# Patient Record
Sex: FEMALE | Race: WHITE | Employment: OTHER | URBAN - METROPOLITAN AREA
[De-identification: names, ages, dates, MRNs, and addresses within clinical notes are randomized per-mention and may not be internally consistent; named-entity substitution may affect disease eponyms.]

---

## 2017-10-13 PROBLEM — H61.22 EXCESSIVE CERUMEN IN EAR CANAL, LEFT: Status: ACTIVE | Noted: 2017-10-11

## 2017-10-13 PROBLEM — N39.41 URGE INCONTINENCE: Status: ACTIVE | Noted: 2017-10-13

## 2017-10-13 PROBLEM — N39.3 STRESS INCONTINENCE: Status: ACTIVE | Noted: 2017-10-13

## 2017-10-13 PROBLEM — R26.81 UNSTEADY GAIT: Status: ACTIVE | Noted: 2017-02-08

## 2017-10-13 PROBLEM — Z86.010 HISTORY OF COLONIC POLYPS: Status: ACTIVE | Noted: 2017-01-03

## 2017-10-13 PROBLEM — R13.10 DYSPHAGIA: Status: ACTIVE | Noted: 2017-01-26

## 2019-11-04 ENCOUNTER — HOSPITAL ENCOUNTER (INPATIENT)
Age: 64
LOS: 4 days | Discharge: ANOTHER HOSPITAL | DRG: 281 | End: 2019-11-08
Attending: EMERGENCY MEDICINE | Admitting: INTERNAL MEDICINE
Payer: MEDICARE

## 2019-11-04 ENCOUNTER — APPOINTMENT (OUTPATIENT)
Dept: GENERAL RADIOLOGY | Age: 64
DRG: 281 | End: 2019-11-04
Attending: EMERGENCY MEDICINE
Payer: MEDICARE

## 2019-11-04 ENCOUNTER — HOSPITAL ENCOUNTER (EMERGENCY)
Dept: CT IMAGING | Age: 64
Discharge: HOME | DRG: 281 | End: 2019-11-04
Attending: EMERGENCY MEDICINE
Payer: MEDICARE

## 2019-11-04 DIAGNOSIS — I48.91 A-FIB (HCC): ICD-10-CM

## 2019-11-04 DIAGNOSIS — R06.09 DOE (DYSPNEA ON EXERTION): ICD-10-CM

## 2019-11-04 DIAGNOSIS — R94.39 ABNORMAL NUCLEAR STRESS TEST: ICD-10-CM

## 2019-11-04 DIAGNOSIS — R77.8 ELEVATED TROPONIN: ICD-10-CM

## 2019-11-04 DIAGNOSIS — I48.91 ATRIAL FIBRILLATION WITH RVR (HCC): Primary | ICD-10-CM

## 2019-11-04 LAB
ALBUMIN SERPL-MCNC: 3.8 G/DL (ref 3.4–5)
ALBUMIN/GLOB SERPL: 0.9 {RATIO} (ref 0.8–1.7)
ALP SERPL-CCNC: 110 U/L (ref 45–117)
ALT SERPL-CCNC: 26 U/L (ref 13–56)
ANION GAP SERPL CALC-SCNC: 8 MMOL/L (ref 3–18)
APTT PPP: 29.3 SEC (ref 23–36.4)
AST SERPL-CCNC: 22 U/L (ref 10–38)
ATRIAL RATE: 105 BPM
ATRIAL RATE: 326 BPM
BASOPHILS # BLD: 0 K/UL (ref 0–0.1)
BASOPHILS NFR BLD: 1 % (ref 0–2)
BILIRUB SERPL-MCNC: 0.3 MG/DL (ref 0.2–1)
BNP SERPL-MCNC: 403 PG/ML (ref 0–900)
BUN SERPL-MCNC: 15 MG/DL (ref 7–18)
BUN/CREAT SERPL: 14 (ref 12–20)
CALCIUM SERPL-MCNC: 9.8 MG/DL (ref 8.5–10.1)
CALCULATED R AXIS, ECG10: -88 DEGREES
CALCULATED R AXIS, ECG10: -90 DEGREES
CALCULATED T AXIS, ECG11: 41 DEGREES
CALCULATED T AXIS, ECG11: 51 DEGREES
CHLORIDE SERPL-SCNC: 109 MMOL/L (ref 100–111)
CK MB CFR SERPL CALC: 3.3 % (ref 0–4)
CK MB CFR SERPL CALC: 3.7 % (ref 0–4)
CK MB SERPL-MCNC: 6.7 NG/ML (ref 5–25)
CK MB SERPL-MCNC: 7.2 NG/ML (ref 5–25)
CK SERPL-CCNC: 182 U/L (ref 26–192)
CK SERPL-CCNC: 215 U/L (ref 26–192)
CO2 SERPL-SCNC: 26 MMOL/L (ref 21–32)
CREAT SERPL-MCNC: 1.09 MG/DL (ref 0.6–1.3)
D DIMER PPP FEU-MCNC: 1.13 UG/ML(FEU)
DIAGNOSIS, 93000: NORMAL
DIAGNOSIS, 93000: NORMAL
DIFFERENTIAL METHOD BLD: NORMAL
EOSINOPHIL # BLD: 0.2 K/UL (ref 0–0.4)
EOSINOPHIL NFR BLD: 3 % (ref 0–5)
ERYTHROCYTE [DISTWIDTH] IN BLOOD BY AUTOMATED COUNT: 14.3 % (ref 11.6–14.5)
GLOBULIN SER CALC-MCNC: 4.4 G/DL (ref 2–4)
GLUCOSE SERPL-MCNC: 122 MG/DL (ref 74–99)
HCT VFR BLD AUTO: 44.7 % (ref 35–45)
HGB BLD-MCNC: 14.3 G/DL (ref 12–16)
LYMPHOCYTES # BLD: 2.7 K/UL (ref 0.9–3.6)
LYMPHOCYTES NFR BLD: 33 % (ref 21–52)
MCH RBC QN AUTO: 28.5 PG (ref 24–34)
MCHC RBC AUTO-ENTMCNC: 32 G/DL (ref 31–37)
MCV RBC AUTO: 89.2 FL (ref 74–97)
MONOCYTES # BLD: 0.8 K/UL (ref 0.05–1.2)
MONOCYTES NFR BLD: 10 % (ref 3–10)
NEUTS SEG # BLD: 4.3 K/UL (ref 1.8–8)
NEUTS SEG NFR BLD: 53 % (ref 40–73)
PLATELET # BLD AUTO: 294 K/UL (ref 135–420)
PMV BLD AUTO: 9.9 FL (ref 9.2–11.8)
POTASSIUM SERPL-SCNC: 4.1 MMOL/L (ref 3.5–5.5)
PROT SERPL-MCNC: 8.2 G/DL (ref 6.4–8.2)
Q-T INTERVAL, ECG07: 288 MS
Q-T INTERVAL, ECG07: 310 MS
QRS DURATION, ECG06: 112 MS
QRS DURATION, ECG06: 118 MS
QTC CALCULATION (BEZET), ECG08: 397 MS
QTC CALCULATION (BEZET), ECG08: 405 MS
RBC # BLD AUTO: 5.01 M/UL (ref 4.2–5.3)
SODIUM SERPL-SCNC: 143 MMOL/L (ref 136–145)
TROPONIN I BLD-MCNC: 0.11 NG/ML (ref 0–0.08)
TROPONIN I SERPL-MCNC: 0.05 NG/ML (ref 0–0.04)
TROPONIN I SERPL-MCNC: 0.24 NG/ML (ref 0–0.04)
VENTRICULAR RATE, ECG03: 119 BPM
VENTRICULAR RATE, ECG03: 99 BPM
WBC # BLD AUTO: 8.1 K/UL (ref 4.6–13.2)

## 2019-11-04 PROCEDURE — 85379 FIBRIN DEGRADATION QUANT: CPT

## 2019-11-04 PROCEDURE — 74011636320 HC RX REV CODE- 636/320: Performed by: EMERGENCY MEDICINE

## 2019-11-04 PROCEDURE — 74011250636 HC RX REV CODE- 250/636: Performed by: INTERNAL MEDICINE

## 2019-11-04 PROCEDURE — 65270000029 HC RM PRIVATE

## 2019-11-04 PROCEDURE — 93005 ELECTROCARDIOGRAM TRACING: CPT

## 2019-11-04 PROCEDURE — 82550 ASSAY OF CK (CPK): CPT

## 2019-11-04 PROCEDURE — 74011250637 HC RX REV CODE- 250/637: Performed by: EMERGENCY MEDICINE

## 2019-11-04 PROCEDURE — 71275 CT ANGIOGRAPHY CHEST: CPT

## 2019-11-04 PROCEDURE — 84484 ASSAY OF TROPONIN QUANT: CPT

## 2019-11-04 PROCEDURE — 99285 EMERGENCY DEPT VISIT HI MDM: CPT

## 2019-11-04 PROCEDURE — 85730 THROMBOPLASTIN TIME PARTIAL: CPT

## 2019-11-04 PROCEDURE — 83880 ASSAY OF NATRIURETIC PEPTIDE: CPT

## 2019-11-04 PROCEDURE — 96374 THER/PROPH/DIAG INJ IV PUSH: CPT

## 2019-11-04 PROCEDURE — 74011000250 HC RX REV CODE- 250: Performed by: EMERGENCY MEDICINE

## 2019-11-04 PROCEDURE — 74011250636 HC RX REV CODE- 250/636: Performed by: EMERGENCY MEDICINE

## 2019-11-04 PROCEDURE — 85025 COMPLETE CBC W/AUTO DIFF WBC: CPT

## 2019-11-04 PROCEDURE — 96375 TX/PRO/DX INJ NEW DRUG ADDON: CPT

## 2019-11-04 PROCEDURE — 71045 X-RAY EXAM CHEST 1 VIEW: CPT

## 2019-11-04 PROCEDURE — 74011000250 HC RX REV CODE- 250: Performed by: INTERNAL MEDICINE

## 2019-11-04 PROCEDURE — 80053 COMPREHEN METABOLIC PANEL: CPT

## 2019-11-04 RX ORDER — METOPROLOL SUCCINATE 25 MG/1
25 TABLET, EXTENDED RELEASE ORAL ONCE
Status: COMPLETED | OUTPATIENT
Start: 2019-11-04 | End: 2019-11-04

## 2019-11-04 RX ORDER — DIGOXIN 0.25 MG/ML
250 INJECTION INTRAMUSCULAR; INTRAVENOUS
Status: COMPLETED | OUTPATIENT
Start: 2019-11-04 | End: 2019-11-04

## 2019-11-04 RX ORDER — ONDANSETRON 2 MG/ML
4 INJECTION INTRAMUSCULAR; INTRAVENOUS
Status: COMPLETED | OUTPATIENT
Start: 2019-11-04 | End: 2019-11-04

## 2019-11-04 RX ORDER — DILTIAZEM HYDROCHLORIDE 120 MG/1
240 CAPSULE, COATED, EXTENDED RELEASE ORAL
Status: COMPLETED | OUTPATIENT
Start: 2019-11-04 | End: 2019-11-04

## 2019-11-04 RX ORDER — DILTIAZEM HYDROCHLORIDE 5 MG/ML
10 INJECTION INTRAVENOUS
Status: COMPLETED | OUTPATIENT
Start: 2019-11-04 | End: 2019-11-04

## 2019-11-04 RX ORDER — CARVEDILOL 12.5 MG/1
6.25 TABLET ORAL
Status: COMPLETED | OUTPATIENT
Start: 2019-11-04 | End: 2019-11-04

## 2019-11-04 RX ORDER — FENTANYL CITRATE 50 UG/ML
50 INJECTION, SOLUTION INTRAMUSCULAR; INTRAVENOUS
Status: COMPLETED | OUTPATIENT
Start: 2019-11-04 | End: 2019-11-04

## 2019-11-04 RX ORDER — HEPARIN SODIUM 10000 [USP'U]/100ML
12-25 INJECTION, SOLUTION INTRAVENOUS
Status: DISCONTINUED | OUTPATIENT
Start: 2019-11-04 | End: 2019-11-04

## 2019-11-04 RX ORDER — HEPARIN SODIUM 1000 [USP'U]/ML
4000 INJECTION, SOLUTION INTRAVENOUS; SUBCUTANEOUS ONCE
Status: COMPLETED | OUTPATIENT
Start: 2019-11-04 | End: 2019-11-04

## 2019-11-04 RX ORDER — HEPARIN SODIUM 10000 [USP'U]/100ML
8.9-25 INJECTION, SOLUTION INTRAVENOUS
Status: DISCONTINUED | OUTPATIENT
Start: 2019-11-04 | End: 2019-11-05

## 2019-11-04 RX ORDER — DILTIAZEM HYDROCHLORIDE 120 MG/1
120 CAPSULE, COATED, EXTENDED RELEASE ORAL DAILY
Status: DISCONTINUED | OUTPATIENT
Start: 2019-11-05 | End: 2019-11-04

## 2019-11-04 RX ORDER — DILTIAZEM HYDROCHLORIDE 120 MG/1
240 CAPSULE, COATED, EXTENDED RELEASE ORAL DAILY
Status: DISCONTINUED | OUTPATIENT
Start: 2019-11-05 | End: 2019-11-04

## 2019-11-04 RX ADMIN — DIGOXIN 250 MCG: 0.25 INJECTION INTRAMUSCULAR; INTRAVENOUS at 19:57

## 2019-11-04 RX ADMIN — HEPARIN SODIUM 8.9 UNITS/KG/HR: 10000 INJECTION, SOLUTION INTRAVENOUS at 22:36

## 2019-11-04 RX ADMIN — IOPAMIDOL 100 ML: 755 INJECTION, SOLUTION INTRAVENOUS at 21:14

## 2019-11-04 RX ADMIN — DILTIAZEM HYDROCHLORIDE 10 MG: 5 INJECTION INTRAVENOUS at 17:04

## 2019-11-04 RX ADMIN — ONDANSETRON 4 MG: 2 INJECTION INTRAMUSCULAR; INTRAVENOUS at 21:06

## 2019-11-04 RX ADMIN — SODIUM CHLORIDE 1000 ML: 900 INJECTION, SOLUTION INTRAVENOUS at 17:02

## 2019-11-04 RX ADMIN — FENTANYL CITRATE 50 MCG: 50 INJECTION INTRAMUSCULAR; INTRAVENOUS at 20:35

## 2019-11-04 RX ADMIN — CARVEDILOL 6.25 MG: 12.5 TABLET, FILM COATED ORAL at 17:51

## 2019-11-04 RX ADMIN — NEOMYCIN AND POLYMYXIN B SULFATES AND BACITRACIN ZINC 1 PACKET: 400; 3.5; 5 OINTMENT TOPICAL at 23:49

## 2019-11-04 RX ADMIN — HEPARIN SODIUM 4000 UNITS: 1000 INJECTION INTRAVENOUS; SUBCUTANEOUS at 22:35

## 2019-11-04 RX ADMIN — DILTIAZEM HYDROCHLORIDE 240 MG: 120 CAPSULE, COATED, EXTENDED RELEASE ORAL at 18:37

## 2019-11-04 RX ADMIN — METOPROLOL SUCCINATE 25 MG: 25 TABLET, EXTENDED RELEASE ORAL at 22:21

## 2019-11-04 NOTE — ED PROVIDER NOTES
EMERGENCY DEPARTMENT HISTORY AND PHYSICAL EXAM    Date: 11/4/2019  Patient Name: Joshua Simeon    History of Presenting Illness     Chief Complaint   Patient presents with    Shortness of Breath    Chest Pain         History Provided By: Patient      Joshua Simeon is a 71 y.o. female with PMHX of atrial fib, bladder issues who presents to the emergency department C/O chest discomfort and shortness of breath. Patient states his symptoms started earlier today while she was at her urologist office. She states the symptoms started to become worse later in the afternoon. She localizes the pain to the center of her chest and radiates down both of her arms. She states she feels like she is having a hard time catching her breath. She states she was told by her urologist to hold all of her medications in the morning for the bladder procedure she had done today in their office. She states she does take diltiazem 240 mg and carvedilol 6.25 mg twice a day. She states she is not on blood thinners but does take a daily aspirin. PCP: Jerel Pastrana MD    Current Facility-Administered Medications   Medication Dose Route Frequency Provider Last Rate Last Dose    heparin (porcine) 1,000 unit/mL injection 4,000 Units  4,000 Units IntraVENous ONCE Vanesa Velazquez, DO        heparin 25,000 units in D5W 250 ml infusion  12-25 Units/kg/hr IntraVENous TITRATE Vanesa Velazquez DO        metoprolol succinate (TOPROL-XL) XL tablet 25 mg  25 mg Oral ONCE Vanesa Velazquez, DO         Current Outpatient Medications   Medication Sig Dispense Refill    nitroglycerin (NITROSTAT) 0.4 mg SL tablet 0.4 mg.      docusate sodium (COLACE) 100 mg capsule EQ STOOL SOFTENER CAPS      dilTIAZem CD (CARTIA XT) 240 mg ER capsule Take 1 Cap by mouth.       clotrimazole-betamethasone (LOTRISONE) topical cream CLOTRIMAZOLE-BETAMETHASONE 1-0.05 % CREA      cholecalciferol, vitamin D3, 2,000 unit tab Take 1 Tab by mouth.  carvedilol (COREG) 6.25 mg tablet 6.25 mg.      busPIRone (BUSPAR) 5 mg tablet Take 5 mg by mouth.  aspirin delayed-release 81 mg tablet Take 1 Tab by mouth.  albuterol (PROAIR HFA) 90 mcg/actuation inhaler PROAIR  (90 Base) MCG/ACT AERS      nystatin (MYCOSTATIN) powder NYSTATIN 126110 UNIT/GM POWD      omega-3 acid ethyl esters (LOVAZA) 1 gram capsule FISH OIL 1000 MG CAPS      omeprazole (PRILOSEC) 20 mg capsule Take 20 mg by mouth.  oxyCODONE-acetaminophen (PERCOCET) 7.5-325 mg per tablet Take 1 Tab by mouth.  potassium chloride SR (KLOR-CON 10) 10 mEq tablet Take 1 Tab by mouth.  pravastatin (PRAVACHOL) 40 mg tablet Take 40 mg by mouth.  promethazine (PHENERGAN) 25 mg tablet Take 25 mg by mouth.  spironolactone (ALDACTONE) 25 mg tablet Take 1 Tab by mouth.  venlafaxine-SR (EFFEXOR XR) 75 mg capsule Take 225 mg by mouth. Past History     Past Medical History:  Past Medical History:   Diagnosis Date    Adenocarcinoma of lung ([address] 4/14/2008    Arthritis     Arthropathy     Bladder pain     Cardiac arrhythmia     Chronic right-sided low back pain without sciatica 4/23/2014    Degeneration of intervertebral disc of lumbosacral region 8/24/2009    Last Assessment & Plan:    Patient will be continued on oxycodone acetaminophen  7.5-325 mg tablets as directed. Total of 120 dispensed month. Patient is on a quarterly visiting schedule for her appointments  As per contract.     Difficulty urinating     Dysphagia 1/26/2017    Dysuria 2/11/2016    Enthesopathy of hip region 1/8/2009    Esophageal reflux     Excessive cerumen in ear canal, left 10/11/2017    Exposure of implanted vaginal mesh and other prosthetic materials into vagina 5/15/2012    Frequency of micturition 4/17/2012    Gastroesophageal reflux disease 9/30/2004    History of blood transfusion     HTN (hypertension)     Hypertensive heart disease 1/21/2015  [facility]     Malignant neoplasm without specification of site ([facility] [facility]     Nocturia     Osteopenia 2012    SOB (shortness of breath)     Stress incontinence 10/13/2017    Urge incontinence     Urinary frequency        Past Surgical History:  Past Surgical History:   Procedure Laterality Date    HX  SECTION      HX UROLOGICAL  2017    Cysto, injection for chemodenervation of bladder, endoscopic injection of bulking material for urinary incontinence. Dr. Zita Lundy, [facility]. Family History:  History reviewed. No pertinent family history. Social History:  Social History     Tobacco Use    Smoking status: Former Smoker     Years: 15.00     Types: Cigarettes    Smokeless tobacco: Never Used   Substance Use Topics    Alcohol use: No    Drug use: No       Allergies: Allergies   Allergen Reactions    Latex Hives    Codeine Unknown (comments) and Anaphylaxis    Sulfa (Sulfonamide Antibiotics) Anaphylaxis    Ibuprofen Unknown (comments)    Nitrofurantoin Other (comments)    Penicillin G Unknown (comments)    Penicillins Hives    Sulfasalazine Unknown (comments)    Tramadol Other (comments) and Nausea and Vomiting         Review of Systems   Review of Systems   Constitutional: Negative for fever. Respiratory: Positive for chest tightness and shortness of breath. Cardiovascular: Positive for chest pain. Gastrointestinal: Negative for abdominal pain, nausea and vomiting.          Physical Exam     Vitals:    19 2000 19 2030 19 2045   BP: 117/69  137/78 141/77   Pulse: 97 (!) 105 (!) 111 (!) 108   Resp: 21 18 22 11   Temp:       SpO2: 98% 98% 98% 98%   Weight:       Height:         Physical Exam    Nursing notes and vital signs reviewed    Constitutional: Non toxic appearing, appears uncomfortable in mild distress  Head: Normocephalic, Atraumatic  Eyes: Pupils are equal, round, and reactive to light, EOMI  Neck: Supple  Cardiovascular: Tachycardic, irregular rhythm, no murmurs, rubs, or gallops  Chest: Normal work of breathing and chest excursion bilaterally  Lungs: Clear to ausculation bilaterally  Abdomen: Soft, non tender, non distended, normoactive bowel sounds  Back: No evidence of trauma or deformity  Extremities: No evidence of trauma or deformity, no LE edema  Skin: Warm and dry, normal cap refill  Neuro: Alert and appropriate, CN intact, normal speech, strength and sensation full and symmetric bilaterally, normal gait, normal coordination  Psychiatric: Normal mood and affect      Diagnostic Study Results     Labs -     Recent Results (from the past 48 hour(s))   EKG, 12 LEAD, INITIAL    Collection Time: 11/04/19  4:36 PM   Result Value Ref Range    Ventricular Rate 119 BPM    Atrial Rate 326 BPM    QRS Duration 112 ms    Q-T Interval 288 ms    QTC Calculation (Bezet) 405 ms    Calculated R Axis -90 degrees    Calculated T Axis 51 degrees    Diagnosis       Atrial fibrillation with rapid ventricular response  Right bundle branch block  Inferior infarct , age undetermined  Anterolateral infarct , age undetermined  Abnormal ECG  No previous ECGs available     CBC WITH AUTOMATED DIFF    Collection Time: 11/04/19  4:41 PM   Result Value Ref Range    WBC 8.1 4.6 - 13.2 K/uL    RBC 5.01 4.20 - 5.30 M/uL    HGB 14.3 12.0 - 16.0 g/dL    HCT 44.7 35.0 - 45.0 %    MCV 89.2 74.0 - 97.0 FL    MCH 28.5 24.0 - 34.0 PG    MCHC 32.0 31.0 - 37.0 g/dL    RDW 14.3 11.6 - 14.5 %    PLATELET 782 933 - 092 K/uL    MPV 9.9 9.2 - 11.8 FL    NEUTROPHILS 53 40 - 73 %    LYMPHOCYTES 33 21 - 52 %    MONOCYTES 10 3 - 10 %    EOSINOPHILS 3 0 - 5 %    BASOPHILS 1 0 - 2 %    ABS. NEUTROPHILS 4.3 1.8 - 8.0 K/UL    ABS. LYMPHOCYTES 2.7 0.9 - 3.6 K/UL    ABS. MONOCYTES 0.8 0.05 - 1.2 K/UL    ABS. EOSINOPHILS 0.2 0.0 - 0.4 K/UL    ABS.  BASOPHILS 0.0 0.0 - 0.1 K/UL    DF AUTOMATED     METABOLIC PANEL, COMPREHENSIVE    Collection Time: 11/04/19  4:41 PM Result Value Ref Range    Sodium 143 136 - 145 mmol/L    Potassium 4.1 3.5 - 5.5 mmol/L    Chloride 109 100 - 111 mmol/L    CO2 26 21 - 32 mmol/L    Anion gap 8 3.0 - 18 mmol/L    Glucose 122 (H) 74 - 99 mg/dL    BUN 15 7.0 - 18 MG/DL    Creatinine 1.09 0.6 - 1.3 MG/DL    BUN/Creatinine ratio 14 12 - 20      GFR est AA >60 >60 ml/min/1.73m2    GFR est non-AA 50 (L) >60 ml/min/1.73m2    Calcium 9.8 8.5 - 10.1 MG/DL    Bilirubin, total 0.3 0.2 - 1.0 MG/DL    ALT (SGPT) 26 13 - 56 U/L    AST (SGOT) 22 10 - 38 U/L    Alk.  phosphatase 110 45 - 117 U/L    Protein, total 8.2 6.4 - 8.2 g/dL    Albumin 3.8 3.4 - 5.0 g/dL    Globulin 4.4 (H) 2.0 - 4.0 g/dL    A-G Ratio 0.9 0.8 - 1.7     NT-PRO BNP    Collection Time: 11/04/19  4:41 PM   Result Value Ref Range    NT pro- 0 - 900 PG/ML   CARDIAC PANEL,(CK, CKMB & TROPONIN)    Collection Time: 11/04/19  4:41 PM   Result Value Ref Range     (H) 26 - 192 U/L    CK - MB 7.2 (H) <3.6 ng/ml    CK-MB Index 3.3 0.0 - 4.0 %    Troponin-I, QT 0.05 (H) 0.0 - 0.045 NG/ML   CARDIAC PANEL,(CK, CKMB & TROPONIN)    Collection Time: 11/04/19  7:58 PM   Result Value Ref Range     26 - 192 U/L    CK - MB 6.7 (H) <3.6 ng/ml    CK-MB Index 3.7 0.0 - 4.0 %    Troponin-I, QT 0.24 (H) 0.0 - 0.045 NG/ML   D DIMER    Collection Time: 11/04/19  7:58 PM   Result Value Ref Range    D DIMER 1.13 (H) <0.46 ug/ml(FEU)   EKG, 12 LEAD, SUBSEQUENT    Collection Time: 11/04/19  8:26 PM   Result Value Ref Range    Ventricular Rate 99 BPM    Atrial Rate 105 BPM    QRS Duration 118 ms    Q-T Interval 310 ms    QTC Calculation (Bezet) 397 ms    Calculated R Axis -88 degrees    Calculated T Axis 41 degrees    Diagnosis       Atrial fibrillation  Left axis deviation  Low voltage QRS  Right bundle branch block  Inferior infarct (cited on or before 04-NOV-2019)  Possible Anterolateral infarct (cited on or before 04-NOV-2019)  Abnormal ECG  When compared with ECG of 04-NOV-2019 16:36,  Questionable change in initial forces of Inferior leads         Radiologic Studies -   CTA CHEST W OR W WO CONT   Final Result   IMPRESSION:      1. No evidence of pulmonary embolism. 2.  No consolidation. 3. Indeterminate 3 mm right lower lobe nodule. Please see below Fleischner   Thor[employer] Society 2017 recommendations regarding follow-up of pulmonary nodules   of this size:   Single Nodule   A. Low risk:      < 6 mm: No routine follow up unless suspicious nodule morphology, upper lobe   location or both   B. High smoking or other exposure risk:      < 6 mm: No routine follow up. If suspicious nodule morphology, upper lobe   location or both, then F/U CT at 12 mo.      4. Indeterminate left adrenal lesion, statistically likely adenoma unless there   is known primary carcinoma. 5. Strictly indeterminate right renal hypodensity, perhaps hemorrhagic cyst.   Potentially routine outpatient follow-up renal ultrasound may better   characterize. XR CHEST PORT   Final Result   IMPRESSION:      Moderately underexpanded lungs without superimposed acute radiographic   abnormality. CT Results  (Last 48 hours)               11/04/19 2123  CTA CHEST W OR W WO CONT Final result    Impression:  IMPRESSION:       1. No evidence of pulmonary embolism. 2.  No consolidation. 3. Indeterminate 3 mm right lower lobe nodule. Please see below Fleischner   Thoracic Society 2017 recommendations regarding follow-up of pulmonary nodules   of this size:   Single Nodule   A. Low risk:      < 6 mm: No routine follow up unless suspicious nodule morphology, upper lobe   location or both   B. High smoking or other exposure risk:      < 6 mm: No routine follow up. If suspicious nodule morphology, upper lobe   location or both, then F/U CT at 12 mo.       4. Indeterminate left adrenal lesion, statistically likely adenoma unless there   is known primary carcinoma.        5. Strictly indeterminate right renal hypodensity, perhaps hemorrhagic cyst.   Potentially routine outpatient follow-up renal ultrasound may better   characterize. Narrative:  EXAM: CTA CHEST       INDICATION: Midsternal chest pain, shortness of breath. Possible PE.       COMPARISON: No prior study. TECHNIQUE: Axial CT imaging from the thoracic inlet through the diaphragm with   intravenous contrast utilizing CTA study for pulmonary artery evaluation. Coronal and sagittal MIP reformations were generated at a separate workstation. One or more dose reduction techniques were used on this CT: automated exposure   control, adjustment of the mAs and/or kVp according to patient's size, and   iterative reconstruction techniques. The specific techniques utilized on this CT   exam have been documented in the patient's electronic medical record. Digital   Imaging and Communications in Medicine (DICOM) format image data are available   to nonaffiliated external healthcare facilities or entities on a secure, media   free, reciprocally searchable basis with patient authorization for at least a   12-month period after this study. _______________       FINDINGS:       EXAM QUALITY: Overall exam quality is adequate. Pulmonary arterial enhancement   is adequate. The breath hold is satisfactory. PULMONARY ARTERIES: No convincing evidence of pulmonary embolism. MEDIASTINUM: Minimal atherosclerotic calcifications are present thoracic aorta. Minimal pericardial effusion is present. PLEURA: Unremarkable. LYMPH NODES: No enlarged lymph nodes by size criteria. LUNGS/AIRWAY: 3 mm nodule is present superior segment right lower lobe axial   image 46. Calcified granuloma is present anteriorly in the right middle lobe axial image   67. 3 mm lateral subpleural right middle lobe nodular density is present on   axial image 74, nonspecific but likely intrapulmonary lymph node. No consolidation is present.        UPPER ABDOMEN: 18 mm hypodensity is present associated with the lateral limb   left adrenal gland, indeterminate on this study with Hounsfield units in the   20s. Additional 13 mm right renal upper pole hypodensity, exophytic, is present   with Hounsfield units also indeterminate. OSSEOUS STRUCTURES: Degenerative changes are seen in the spine. OTHER: None   _______________               CXR Results  (Last 48 hours)               11/04/19 1650  XR CHEST PORT Final result    Impression:  IMPRESSION:       Moderately underexpanded lungs without superimposed acute radiographic   abnormality. Narrative:  EXAM: XR CHEST PORT       CLINICAL INDICATION/HISTORY: Shortness of breath with chest pain   -Additional: None       COMPARISON: None       TECHNIQUE: Frontal view of the chest       _______________       FINDINGS:       HEART AND MEDIASTINUM: Normal cardiac size and mediastinal contours. LUNGS AND PLEURAL SPACES: Lungs are moderately underexpanded. Left hemidiaphragm   is mildly elevated. No focal pneumonic opacity. No evidence of pneumothorax or   pleural effusion.        BONY THORAX AND SOFT TISSUES: No acute osseous abnormality       _______________                 Medications given in the ED-  Medications   heparin (porcine) 1,000 unit/mL injection 4,000 Units (has no administration in time range)   heparin 25,000 units in D5W 250 ml infusion (has no administration in time range)   metoprolol succinate (TOPROL-XL) XL tablet 25 mg (has no administration in time range)   dilTIAZem (CARDIZEM) injection 10 mg (10 mg IntraVENous Given 11/4/19 1704)   sodium chloride 0.9 % bolus infusion 1,000 mL (0 mL IntraVENous IV Completed 11/4/19 1732)   carvedilol (COREG) tablet 6.25 mg (6.25 mg Oral Given 11/4/19 1751)   dilTIAZem CD (CARDIZEM CD) capsule 240 mg (240 mg Oral Given 11/4/19 1837)   digoxin (LANOXIN) injection 250 mcg (250 mcg IntraVENous Given 11/4/19 1957)   fentaNYL citrate (PF) injection 50 mcg (50 mcg IntraVENous Given 11/4/19 2035)   ondansetron [facility]) injection 4 mg (4 mg IntraVENous Given 11/4/19 2106)         Medical Decision Making   I am the first provider for this patient. I reviewed the vital signs, available nursing notes, past medical history, past surgical history, family history and social history. Vital Signs-Reviewed the patient's vital signs. Pulse Oximetry Analysis - 100% on room air     Cardiac Monitor:  Rate: 115 bpm  Rhythm: sinus    EKG interpretation: (Preliminary)  EKG read by Dr. Nieves Balderas 119 atrial fib with rapid ventricular response, right bundle branch block, no ST changes, right axis    EKG interpretation: (Preliminary)  8:32 PM   A. fib 99 bpm. No acute JAMAR. Records Reviewed: Nursing Notes    Procedures:  Procedures    ED Course:   Patient's RVR is likely due to the fact that she was unable to take her medications this morning for the bladder procedure. We will give 10 mg of Cardizem IV and continue to monitor    Patient was given her oral home dose of carvedilol 6.25 mg as well as her home dose of extended release Cardizem 240 mg. She continued to have mild rapid ventricular rate between 100-115 the heart rate. SIGN OUT:  8:31 PM  Patient's presentation, labs/imaging and plan of care was reviewed with Dr. Jeff Soriano as part of sign out. They will follow up with Afib RVR as part of the plan discussed with the patient. Bella Teague DO      7:42 PM Discussed patient's history, exam, and available diagnostics results with Jeff Montoya MD, cardiology, who recommends digoxin, 2nd set of cardiac enzymes and D dimer     9:49 PM patient's troponin is not elevated to 0.24 from 0.05.  D-dimer was elevated however CTA was negative for PE.   Discussed patient's history, exam, and available diagnostics results with Jeff Montoya MD, cardiology, who agree with admission, starting a heparin drip, a dose of 25 mg of metoprolol XL now and then daily, echo in the morning        Diagnosis and Disposition     9:49 PM  I have spent 40 minutes of critical care time involved in lab review, consultations with specialist, family decision-making, and documentation. During this entire length of time I was immediately available to the patient. Critical Care: The reason for providing this level of medical care for this critically ill patient was due a critical illness that impaired one or more vital organ systems such that there was a high probability of imminent or life threatening deterioration in the patients condition. This care involved high complexity decision making to assess, manipulate, and support vital system functions, to treat this degreee vital organ system failure and to prevent further life threatening deterioration of the patients condition. Core Measures:  For Hospitalized Patients: Telemetry    1. Hospitalization Decision Time:  The decision to hospitalize the patient was made by 9:50 PM at 9:50 PM on 11/4/2019    2. Aspirin: Aspirin was not given because the patient did not present with a stroke at the time of their Emergency Department evaluation    9:14 PM  Patient is being admitted to the hospital by Altagracia Saenz MD. The results of their tests and reasons for their admission have been discussed with them and/or available family. They convey agreement and understanding for the need to be admitted and for their admission diagnosis. CONDITIONS ON ADMISSION:  Sepsis is not present at the time of admission. Pneumonia is not present at the time of admission. MRSA is not present at the time of admission. Wound infection is not present at the time of admission. Pressure Ulcer is not present at the time of admission. CLINICAL IMPRESSION:    1. Atrial fibrillation with RVR (HCC)    2. Elevated troponin      _______________________________      Please note that this dictation was completed with [employer], the Dragon Inside voice recognition software. Quite often unanticipated grammatical, syntax, homophones, and other interpretive errors are inadvertently transcribed by the computer software. Please disregard these errors. Please excuse any errors that have escaped final proofreading.

## 2019-11-04 NOTE — Clinical Note
TRANSFER - IN REPORT:  
 
Verbal report received from: Christiano. Report consisted of patient's Situation, Background, Assessment and  
Recommendations(SBAR). Opportunity for questions and clarification was provided. Assessment completed upon patient's arrival to unit and care assumed. Patient transported with a Cardiac Cath Tech / Patient Care Tech.

## 2019-11-04 NOTE — Clinical Note
Contrast Dose Calculator:  
Patient's age: 71.  
Patient's sex: Female. Patient weight (kg) = 114.4. Creatinine level (mg/dL) = 0.94. Creatinine clearance (mL/min): 107.76. Max Contrast dose per Creatinine Cl calculator = 242.45 mL.

## 2019-11-04 NOTE — Clinical Note
TRANSFER - OUT REPORT:  
 
Verbal report given to: [place]. Report consisted of patient's Situation, Background, Assessment and  
Recommendations(SBAR). Opportunity for questions and clarification was provided. Patient transported with a Cardiac Cath Tech / Patient Care Tech. Patient transported to: Care.

## 2019-11-05 ENCOUNTER — APPOINTMENT (OUTPATIENT)
Dept: NON INVASIVE DIAGNOSTICS | Age: 64
DRG: 281 | End: 2019-11-05
Attending: INTERNAL MEDICINE
Payer: MEDICARE

## 2019-11-05 PROBLEM — R26.81 UNSTEADY GAIT: Status: RESOLVED | Noted: 2017-02-08 | Resolved: 2019-11-05

## 2019-11-05 PROBLEM — R13.10 DYSPHAGIA: Status: RESOLVED | Noted: 2017-01-26 | Resolved: 2019-11-05

## 2019-11-05 PROBLEM — Z86.010 HISTORY OF COLONIC POLYPS: Status: RESOLVED | Noted: 2017-01-03 | Resolved: 2019-11-05

## 2019-11-05 LAB
ALBUMIN SERPL-MCNC: 3 G/DL (ref 3.4–5)
ALBUMIN/GLOB SERPL: 0.8 {RATIO} (ref 0.8–1.7)
ALP SERPL-CCNC: 88 U/L (ref 45–117)
ALT SERPL-CCNC: 19 U/L (ref 13–56)
ANION GAP SERPL CALC-SCNC: 6 MMOL/L (ref 3–18)
APTT PPP: 62.1 SEC (ref 23–36.4)
APTT PPP: 64.5 SEC (ref 23–36.4)
AST SERPL-CCNC: 17 U/L (ref 10–38)
AV PEAK GRADIENT: 36.37 MMHG
AV VELOCITY RATIO: 0.47
AV VTI RATIO: 0.7
BASOPHILS # BLD: 0 K/UL (ref 0–0.1)
BASOPHILS NFR BLD: 1 % (ref 0–2)
BILIRUB DIRECT SERPL-MCNC: 0.1 MG/DL (ref 0–0.2)
BILIRUB SERPL-MCNC: 0.3 MG/DL (ref 0.2–1)
BUN SERPL-MCNC: 12 MG/DL (ref 7–18)
BUN/CREAT SERPL: 11 (ref 12–20)
CALCIUM SERPL-MCNC: 9.2 MG/DL (ref 8.5–10.1)
CHLORIDE SERPL-SCNC: 111 MMOL/L (ref 100–111)
CK MB CFR SERPL CALC: 3.1 % (ref 0–4)
CK MB CFR SERPL CALC: 3.9 % (ref 0–4)
CK MB CFR SERPL CALC: 4 % (ref 0–4)
CK MB SERPL-MCNC: 4.6 NG/ML (ref 5–25)
CK MB SERPL-MCNC: 5.9 NG/ML (ref 5–25)
CK MB SERPL-MCNC: 7 NG/ML (ref 5–25)
CK SERPL-CCNC: 148 U/L (ref 26–192)
CK SERPL-CCNC: 151 U/L (ref 26–192)
CK SERPL-CCNC: 176 U/L (ref 26–192)
CO2 SERPL-SCNC: 26 MMOL/L (ref 21–32)
CREAT SERPL-MCNC: 1.05 MG/DL (ref 0.6–1.3)
DIFFERENTIAL METHOD BLD: ABNORMAL
ECHO AO ASC DIAM: 3.72 CM
ECHO AO ROOT DIAM: 3.22 CM
ECHO AV AREA PEAK VELOCITY: 1.4 CM2
ECHO AV AREA VTI: 2 CM2
ECHO AV AREA/BSA PEAK VELOCITY: 0.6 CM2/M2
ECHO AV AREA/BSA VTI: 0.9 CM2/M2
ECHO AV MEAN GRADIENT: 8.2 MMHG
ECHO AV MEAN VELOCITY: 1.25 M/S
ECHO AV PEAK GRADIENT: 23.2 MMHG
ECHO AV PEAK VELOCITY: 241.07 CM/S
ECHO AV REGURGITANT PHT: 415.5 CM
ECHO AV VTI: 41.82 CM
ECHO LA MAJOR AXIS: 4.8 CM
ECHO LA VOL 2C: 57.78 ML (ref 22–52)
ECHO LA VOL 4C: 44.16 ML (ref 22–52)
ECHO LA VOL BP: 57.21 ML (ref 22–52)
ECHO LA VOL/BSA BIPLANE: 27.09 ML/M2 (ref 16–28)
ECHO LA VOLUME INDEX A2C: 27.36 ML/M2 (ref 16–28)
ECHO LA VOLUME INDEX A4C: 20.91 ML/M2 (ref 16–28)
ECHO LV E' LATERAL VELOCITY: 8 CM/S
ECHO LV E' SEPTAL VELOCITY: 7 CM/S
ECHO LV EDV A2C: 128.4 ML
ECHO LV EDV A4C: 120.3 ML
ECHO LV EDV BP: 126 ML (ref 56–104)
ECHO LV EDV INDEX A4C: 57 ML/M2
ECHO LV EDV INDEX BP: 59.7 ML/M2
ECHO LV EDV NDEX A2C: 60.8 ML/M2
ECHO LV EDV TEICHHOLZ: 0.94 ML
ECHO LV EJECTION FRACTION A2C: 55 %
ECHO LV EJECTION FRACTION A4C: 60 %
ECHO LV EJECTION FRACTION BIPLANE: 57.6 % (ref 55–100)
ECHO LV ESV A2C: 57.7 ML
ECHO LV ESV A4C: 48.2 ML
ECHO LV ESV BP: 53.5 ML (ref 19–49)
ECHO LV ESV INDEX A2C: 27.3 ML/M2
ECHO LV ESV INDEX A4C: 22.8 ML/M2
ECHO LV ESV INDEX BP: 25.3 ML/M2
ECHO LV ESV TEICHHOLZ: 0.49 ML
ECHO LV INTERNAL DIMENSION DIASTOLIC: 5.54 CM (ref 3.9–5.3)
ECHO LV INTERNAL DIMENSION SYSTOLIC: 4.2 CM
ECHO LV IVSD: 1.03 CM (ref 0.6–0.9)
ECHO LV MASS 2D: 279.8 G (ref 67–162)
ECHO LV MASS INDEX 2D: 132.5 G/M2 (ref 43–95)
ECHO LV POSTERIOR WALL DIASTOLIC: 1.11 CM (ref 0.6–0.9)
ECHO LVOT DIAM: 1.92 CM
ECHO LVOT PEAK GRADIENT: 5.2 MMHG
ECHO LVOT PEAK VELOCITY: 113.62 CM/S
ECHO LVOT VTI: 29.15 CM
ECHO MV A VELOCITY: 88.45 CM/S
ECHO MV AREA PHT: 2.4 CM2
ECHO MV AREA VTI: 1.3 CM2
ECHO MV E DECELERATION TIME (DT): 312 MS
ECHO MV E VELOCITY: 174.77 CM/S
ECHO MV E/A RATIO: 1.98
ECHO MV E/E' LATERAL: 21.85
ECHO MV E/E' RATIO (AVERAGED): 23.41
ECHO MV E/E' SEPTAL: 24.97
ECHO MV MAX VELOCITY: 203.08 CM/S
ECHO MV MEAN GRADIENT: 4.8 MMHG
ECHO MV MEAN INFLOW VELOCITY: 0.97 M/S
ECHO MV PEAK GRADIENT: 16.5 MMHG
ECHO MV PRESSURE HALF TIME (PHT): 90.5 MS
ECHO MV REGURGITANT PEAK GRADIENT: 9.3 MMHG
ECHO MV REGURGITANT PEAK VELOCITY: 152.07 CM/S
ECHO MV REGURGITANT VTIA: 46.51 CM
ECHO MV VTI: 64.84 CM
ECHO RA AREA 4C: 15.88 CM2
ECHO RV INTERNAL DIMENSION: 3.79 CM
ECHO TRICUSPID ANNULAR PEAK SYSTOLIC VELOCITY: 1.8 CM/S
ECHO TV REGURGITANT MAX VELOCITY: 234.91 CM/S
ECHO TV REGURGITANT PEAK GRADIENT: 22.1 MMHG
EOSINOPHIL # BLD: 0.2 K/UL (ref 0–0.4)
EOSINOPHIL NFR BLD: 2 % (ref 0–5)
ERYTHROCYTE [DISTWIDTH] IN BLOOD BY AUTOMATED COUNT: 14.3 % (ref 11.6–14.5)
GLOBULIN SER CALC-MCNC: 3.6 G/DL (ref 2–4)
GLUCOSE SERPL-MCNC: 113 MG/DL (ref 74–99)
HCT VFR BLD AUTO: 38.7 % (ref 35–45)
HGB BLD-MCNC: 12.3 G/DL (ref 12–16)
INR PPP: 1 (ref 0.8–1.2)
LVFS 2D: 24.19 %
LVOT MG: 2.73 MMHG
LVOT MV: 0.77 CM/S
LVSV (MOD BI): 32.57 ML
LVSV (MOD SINGLE 4C): 32.36 ML
LVSV (MOD SINGLE): 31.78 ML
LVSV (TEICH): 31.97 ML
LYMPHOCYTES # BLD: 3.1 K/UL (ref 0.9–3.6)
LYMPHOCYTES NFR BLD: 36 % (ref 21–52)
MCH RBC QN AUTO: 28.2 PG (ref 24–34)
MCHC RBC AUTO-ENTMCNC: 31.8 G/DL (ref 31–37)
MCV RBC AUTO: 88.8 FL (ref 74–97)
MONOCYTES # BLD: 0.9 K/UL (ref 0.05–1.2)
MONOCYTES NFR BLD: 11 % (ref 3–10)
MV DEC SLOPE: 5.6
NEUTS SEG # BLD: 4.4 K/UL (ref 1.8–8)
NEUTS SEG NFR BLD: 50 % (ref 40–73)
PISA AR MAX VEL: 301.55 CM/S
PLATELET # BLD AUTO: 252 K/UL (ref 135–420)
PMV BLD AUTO: 9.5 FL (ref 9.2–11.8)
POTASSIUM SERPL-SCNC: 4.4 MMOL/L (ref 3.5–5.5)
PROT SERPL-MCNC: 6.6 G/DL (ref 6.4–8.2)
PROTHROMBIN TIME: 13.1 SEC (ref 11.5–15.2)
PV END DIASTOLIC VELOCITY: 1 MMHG
RBC # BLD AUTO: 4.36 M/UL (ref 4.2–5.3)
SODIUM SERPL-SCNC: 143 MMOL/L (ref 136–145)
TROPONIN I SERPL-MCNC: 0.23 NG/ML (ref 0–0.04)
TROPONIN I SERPL-MCNC: 0.36 NG/ML (ref 0–0.04)
TROPONIN I SERPL-MCNC: 0.5 NG/ML (ref 0–0.04)
TSH SERPL DL<=0.05 MIU/L-ACNC: 6.92 UIU/ML (ref 0.36–3.74)
WBC # BLD AUTO: 8.7 K/UL (ref 4.6–13.2)

## 2019-11-05 PROCEDURE — 74011250637 HC RX REV CODE- 250/637: Performed by: HOSPITALIST

## 2019-11-05 PROCEDURE — 85025 COMPLETE CBC W/AUTO DIFF WBC: CPT

## 2019-11-05 PROCEDURE — 85730 THROMBOPLASTIN TIME PARTIAL: CPT

## 2019-11-05 PROCEDURE — 80048 BASIC METABOLIC PNL TOTAL CA: CPT

## 2019-11-05 PROCEDURE — 85610 PROTHROMBIN TIME: CPT

## 2019-11-05 PROCEDURE — 82550 ASSAY OF CK (CPK): CPT

## 2019-11-05 PROCEDURE — 36415 COLL VENOUS BLD VENIPUNCTURE: CPT

## 2019-11-05 PROCEDURE — 74011250636 HC RX REV CODE- 250/636: Performed by: INTERNAL MEDICINE

## 2019-11-05 PROCEDURE — 74011250637 HC RX REV CODE- 250/637: Performed by: INTERNAL MEDICINE

## 2019-11-05 PROCEDURE — 65660000000 HC RM CCU STEPDOWN

## 2019-11-05 PROCEDURE — 80076 HEPATIC FUNCTION PANEL: CPT

## 2019-11-05 PROCEDURE — 97161 PT EVAL LOW COMPLEX 20 MIN: CPT

## 2019-11-05 PROCEDURE — 74011000250 HC RX REV CODE- 250: Performed by: INTERNAL MEDICINE

## 2019-11-05 PROCEDURE — 84443 ASSAY THYROID STIM HORMONE: CPT

## 2019-11-05 PROCEDURE — 93306 TTE W/DOPPLER COMPLETE: CPT

## 2019-11-05 RX ORDER — ENOXAPARIN SODIUM 150 MG/ML
110 INJECTION SUBCUTANEOUS EVERY 12 HOURS
Status: DISCONTINUED | OUTPATIENT
Start: 2019-11-05 | End: 2019-11-06

## 2019-11-05 RX ORDER — ASPIRIN 81 MG/1
81 TABLET ORAL DAILY
Status: DISCONTINUED | OUTPATIENT
Start: 2019-11-05 | End: 2019-11-08 | Stop reason: HOSPADM

## 2019-11-05 RX ORDER — CARVEDILOL 3.12 MG/1
6.25 TABLET ORAL 2 TIMES DAILY WITH MEALS
Status: DISCONTINUED | OUTPATIENT
Start: 2019-11-05 | End: 2019-11-08 | Stop reason: HOSPADM

## 2019-11-05 RX ORDER — SODIUM CHLORIDE 0.9 % (FLUSH) 0.9 %
5-40 SYRINGE (ML) INJECTION EVERY 8 HOURS
Status: DISCONTINUED | OUTPATIENT
Start: 2019-11-05 | End: 2019-11-08 | Stop reason: HOSPADM

## 2019-11-05 RX ORDER — DOCUSATE SODIUM 100 MG/1
100 CAPSULE, LIQUID FILLED ORAL DAILY
Status: DISCONTINUED | OUTPATIENT
Start: 2019-11-05 | End: 2019-11-06

## 2019-11-05 RX ORDER — ADHESIVE BANDAGE
30 BANDAGE TOPICAL DAILY PRN
Status: DISCONTINUED | OUTPATIENT
Start: 2019-11-05 | End: 2019-11-08 | Stop reason: HOSPADM

## 2019-11-05 RX ORDER — ATORVASTATIN CALCIUM 20 MG/1
20 TABLET, FILM COATED ORAL
Status: DISCONTINUED | OUTPATIENT
Start: 2019-11-05 | End: 2019-11-06

## 2019-11-05 RX ORDER — OMEPRAZOLE 20 MG/1
20 CAPSULE, DELAYED RELEASE ORAL DAILY
Status: DISCONTINUED | OUTPATIENT
Start: 2019-11-05 | End: 2019-11-08 | Stop reason: HOSPADM

## 2019-11-05 RX ORDER — NYSTATIN 100000 [USP'U]/G
POWDER TOPICAL 2 TIMES DAILY
Status: DISCONTINUED | OUTPATIENT
Start: 2019-11-05 | End: 2019-11-08 | Stop reason: HOSPADM

## 2019-11-05 RX ORDER — DOCUSATE SODIUM 100 MG/1
100 CAPSULE, LIQUID FILLED ORAL 2 TIMES DAILY
Status: DISCONTINUED | OUTPATIENT
Start: 2019-11-05 | End: 2019-11-08 | Stop reason: HOSPADM

## 2019-11-05 RX ORDER — POTASSIUM CHLORIDE 750 MG/1
10 TABLET, EXTENDED RELEASE ORAL DAILY
Status: DISCONTINUED | OUTPATIENT
Start: 2019-11-05 | End: 2019-11-08 | Stop reason: HOSPADM

## 2019-11-05 RX ORDER — SPIRONOLACTONE 25 MG/1
25 TABLET ORAL DAILY
Status: DISCONTINUED | OUTPATIENT
Start: 2019-11-05 | End: 2019-11-08 | Stop reason: HOSPADM

## 2019-11-05 RX ORDER — ACETAMINOPHEN 325 MG/1
650 TABLET ORAL ONCE
Status: COMPLETED | OUTPATIENT
Start: 2019-11-05 | End: 2019-11-05

## 2019-11-05 RX ORDER — NITROGLYCERIN 0.4 MG/1
0.4 TABLET SUBLINGUAL AS NEEDED
Status: DISCONTINUED | OUTPATIENT
Start: 2019-11-05 | End: 2019-11-08 | Stop reason: HOSPADM

## 2019-11-05 RX ORDER — BUSPIRONE HYDROCHLORIDE 5 MG/1
5 TABLET ORAL 3 TIMES DAILY
Status: DISCONTINUED | OUTPATIENT
Start: 2019-11-05 | End: 2019-11-08 | Stop reason: HOSPADM

## 2019-11-05 RX ORDER — GUAIFENESIN 100 MG/5ML
81 LIQUID (ML) ORAL DAILY
Status: DISCONTINUED | OUTPATIENT
Start: 2019-11-06 | End: 2019-11-05 | Stop reason: SDUPTHER

## 2019-11-05 RX ORDER — SODIUM CHLORIDE 0.9 % (FLUSH) 0.9 %
5-40 SYRINGE (ML) INJECTION AS NEEDED
Status: DISCONTINUED | OUTPATIENT
Start: 2019-11-05 | End: 2019-11-08 | Stop reason: HOSPADM

## 2019-11-05 RX ORDER — PRAVASTATIN SODIUM 20 MG/1
40 TABLET ORAL
Status: DISCONTINUED | OUTPATIENT
Start: 2019-11-05 | End: 2019-11-08 | Stop reason: HOSPADM

## 2019-11-05 RX ORDER — HEPARIN SODIUM 1000 [USP'U]/ML
3000 INJECTION, SOLUTION INTRAVENOUS; SUBCUTANEOUS ONCE
Status: COMPLETED | OUTPATIENT
Start: 2019-11-05 | End: 2019-11-05

## 2019-11-05 RX ADMIN — ACETAMINOPHEN 650 MG: 325 TABLET ORAL at 21:43

## 2019-11-05 RX ADMIN — CARVEDILOL 6.25 MG: 3.12 TABLET, FILM COATED ORAL at 18:00

## 2019-11-05 RX ADMIN — ATORVASTATIN CALCIUM 20 MG: 20 TABLET, FILM COATED ORAL at 00:19

## 2019-11-05 RX ADMIN — DOCUSATE SODIUM 100 MG: 100 CAPSULE, LIQUID FILLED ORAL at 11:25

## 2019-11-05 RX ADMIN — NEOMYCIN AND POLYMYXIN B SULFATES AND BACITRACIN ZINC 1 PACKET: 400; 3.5; 5 OINTMENT TOPICAL at 21:00

## 2019-11-05 RX ADMIN — ATORVASTATIN CALCIUM 20 MG: 20 TABLET, FILM COATED ORAL at 21:43

## 2019-11-05 RX ADMIN — Medication 10 ML: at 21:46

## 2019-11-05 RX ADMIN — CARVEDILOL 6.25 MG: 3.12 TABLET, FILM COATED ORAL at 11:24

## 2019-11-05 RX ADMIN — PRAVASTATIN SODIUM 40 MG: 20 TABLET ORAL at 21:44

## 2019-11-05 RX ADMIN — ASPIRIN 81 MG: 81 TABLET, COATED ORAL at 11:25

## 2019-11-05 RX ADMIN — BUSPIRONE HYDROCHLORIDE 5 MG: 5 TABLET ORAL at 21:44

## 2019-11-05 RX ADMIN — VENLAFAXINE HYDROCHLORIDE 225 MG: 150 CAPSULE, EXTENDED RELEASE ORAL at 11:24

## 2019-11-05 RX ADMIN — POTASSIUM CHLORIDE 10 MEQ: 10 TABLET, EXTENDED RELEASE ORAL at 11:25

## 2019-11-05 RX ADMIN — Medication 10 ML: at 06:24

## 2019-11-05 RX ADMIN — BUSPIRONE HYDROCHLORIDE 5 MG: 5 TABLET ORAL at 11:25

## 2019-11-05 RX ADMIN — BUSPIRONE HYDROCHLORIDE 5 MG: 5 TABLET ORAL at 18:53

## 2019-11-05 RX ADMIN — Medication 10 ML: at 00:19

## 2019-11-05 RX ADMIN — ENOXAPARIN SODIUM 110 MG: 120 INJECTION SUBCUTANEOUS at 18:53

## 2019-11-05 RX ADMIN — NYSTATIN: 100000 POWDER TOPICAL at 21:43

## 2019-11-05 RX ADMIN — HEPARIN SODIUM 9.9 UNITS/KG/HR: 10000 INJECTION, SOLUTION INTRAVENOUS at 04:03

## 2019-11-05 RX ADMIN — DOCUSATE SODIUM 100 MG: 100 CAPSULE, LIQUID FILLED ORAL at 00:18

## 2019-11-05 RX ADMIN — DOCUSATE SODIUM 100 MG: 100 CAPSULE, LIQUID FILLED ORAL at 11:28

## 2019-11-05 RX ADMIN — DOCUSATE SODIUM 100 MG: 100 CAPSULE, LIQUID FILLED ORAL at 21:43

## 2019-11-05 RX ADMIN — HEPARIN SODIUM 3000 UNITS: 1000 INJECTION, SOLUTION INTRAVENOUS; SUBCUTANEOUS at 11:10

## 2019-11-05 RX ADMIN — OMEPRAZOLE 20 MG: 20 CAPSULE, DELAYED RELEASE ORAL at 11:25

## 2019-11-05 RX ADMIN — SPIRONOLACTONE 25 MG: 25 TABLET ORAL at 11:25

## 2019-11-05 RX ADMIN — NYSTATIN: 100000 POWDER TOPICAL at 11:25

## 2019-11-05 RX ADMIN — NEOMYCIN AND POLYMYXIN B SULFATES AND BACITRACIN ZINC 1 PACKET: 400; 3.5; 5 OINTMENT TOPICAL at 09:00

## 2019-11-05 NOTE — CDMP QUERY
Patient admitted and noted to have a  BMI of 43.58. If possible, could you please document in progress notes and discharge summary if you are evaluating and /or treating any of the following: 
 
=> Obesity  
=> Morbid obesity  
=> Overweight 
=> Other, please specify 
=> BMI not clinically significant  
=> unable to determine The medical record reflects the following:  Per \"Patient Overview:\"  Ht: 5' 3\"; Wt: 111.6 kg (246 lb); BMI: 43.58 Thank you for your time, 
Daniel Osborne RN Clinical Documentation Improvement [phone] REFERENCE: 
The [address] [place] [address] has issued a statement indicating that, \"Individuals who are obese or morbidly obese are at an increased risk for certain medical conditions when compared to persons of normal weight. Therefore, these conditions are always clinically significant and reportable when documented by the provider. \" 
 
 - Morbidly Obese:  BMI >/=40 or BMI >35 with obesity-related health condition   (e.g. Type 2 DM, HTN, SHAMIR, GERD, depression, OA weight bearing joints, urinary stress incontinence, etc.) - Obese/Obesity:  BMI 30 - 39.9 
 - Overweight:  BMI 25 - 29.9

## 2019-11-05 NOTE — ED NOTES
Pt complains of mid sternal chest pain that is like \"a hammer hitting me in the chest.\" Pt clutching at chest. Repeat EKG completed. Dr. Gaila Siemens in room to reassess.

## 2019-11-05 NOTE — CONSULTS
[facility] Consult Note      Patient: Otf Craig MRN: [id number]  SSN: [id number]    YOB: 1955  Age: 71 y.o. Sex: female    Date of Consultation: 11/5/2019  Referring Physician: Dr. Buster Davidson  Reason for Consultation: chest pain and Afib    HPI:  I was asked by Dr. Codie Kim to see this pleasant patient for chest pain and Afib and elevated trop. Otf Craig is 71years old male with PMH of PAFIB, lung cancer, bladder incontinence, morbidly obese. Pt recently underwent urinary bladder cystoscopy and then subsequently developed CP and Afib. Moderate CP, central more with exertion and improved with rest.    PE ruled out. Mild trop elevation and now converted to SR    Past Medical History:   Diagnosis Date    Adenocarcinoma of lung ([address] 4/14/2008    Arthritis     Arthropathy     Bladder pain     Cardiac arrhythmia     Chronic right-sided low back pain without sciatica 4/23/2014    Degeneration of intervertebral disc of lumbosacral region 8/24/2009    Last Assessment & Plan:    Patient will be continued on oxycodone acetaminophen  7.5-325 mg tablets as directed. Total of 120 dispensed month. Patient is on a quarterly visiting schedule for her appointments  As per contract.     Difficulty urinating     Dysphagia 1/26/2017    Dysuria 2/11/2016    Enthesopathy of hip region 1/8/2009    Esophageal reflux     Excessive cerumen in ear canal, left 10/11/2017    Exposure of implanted vaginal mesh and other prosthetic materials into vagina 5/15/2012    Frequency of micturition 4/17/2012    Gastroesophageal reflux disease 9/30/2004    History of blood transfusion     HTN (hypertension)     Hypertensive heart disease 1/21/2015    Lung cancer ([address]     Malignant neoplasm without specification of site ([address]     Nocturia     Osteopenia 5/25/2012    SOB (shortness of breath)     Stress incontinence 10/13/2017    Urge incontinence     Urinary frequency      Past Surgical History:   Procedure Laterality Date    HX  SECTION      HX UROLOGICAL  2017    Cysto, injection for chemodenervation of bladder, endoscopic injection of bulking material for urinary incontinence. Dr. Duane Doyle, [facility]. Current Facility-Administered Medications   Medication Dose Route Frequency    nystatin (MYCOSTATIN) 100,000 unit/gram powder   Topical BID    nitroglycerin (NITROSTAT) tablet 0.4 mg  0.4 mg SubLINGual PRN    carvedilol (COREG) tablet 6.25 mg  6.25 mg Oral BID WITH MEALS    aspirin delayed-release tablet 81 mg  81 mg Oral DAILY    sodium chloride (NS) flush 5-40 mL  5-40 mL IntraVENous Q8H    sodium chloride (NS) flush 5-40 mL  5-40 mL IntraVENous PRN    magnesium hydroxide (MILK OF MAGNESIA) 400 mg/5 mL oral suspension 30 mL  30 mL Oral DAILY PRN    docusate sodium (COLACE) capsule 100 mg  100 mg Oral BID    [START ON 2019] aspirin chewable tablet 81 mg  81 mg Oral DAILY    atorvastatin (LIPITOR) tablet 20 mg  20 mg Oral QHS    perflutren lipid microspheres (DEFINITY) in NS bolus IV  1 mL IntraVENous PRN    busPIRone (BUSPAR) tablet 5 mg  5 mg Oral TID    docusate sodium (COLACE) capsule 100 mg  100 mg Oral DAILY    omeprazole (PRILOSEC) capsule 20 mg  20 mg Oral DAILY    potassium chloride (KLOR-CON) tablet 10 mEq  10 mEq Oral DAILY    pravastatin (PRAVACHOL) tablet 40 mg  40 mg Oral QHS    spironolactone (ALDACTONE) tablet 25 mg  25 mg Oral DAILY    venlafaxine-SR (EFFEXOR-XR) capsule 225 mg  225 mg Oral DAILY WITH BREAKFAST    enoxaparin (LOVENOX) injection 120 mg  1 mg/kg SubCUTAneous Q12H    neomycin-bacitracnZn-polymyxnB (NEOSPORIN) ointment 1 Packet  1 Packet Topical BID       Allergies and Intolerances:    Allergies   Allergen Reactions    Latex Hives    Codeine Unknown (comments) and Anaphylaxis    Sulfa (Sulfonamide Antibiotics) Anaphylaxis    Ibuprofen Unknown (comments)    Nitrofurantoin Other (comments)    Penicillin G Unknown (comments)    Penicillins Hives    Sulfasalazine Unknown (comments)    Tramadol Other (comments) and Nausea and Vomiting       Family History:   History reviewed. No pertinent family history. Social History:   She  reports that she has quit smoking. Her smoking use included cigarettes. She quit after 15.00 years of use. She has never used smokeless tobacco.  She  reports that she does not drink alcohol. Review of Systems:     Review of Systems  Gen: No fever, chills, malaise, weight loss/gain. Heent: No headache, rhinorrhea, epistaxis, ear pain, hearing loss, sinus pain, neck pain/stiffness, sore throat. Heart: + chest pain, palpitations, +WEEKS, pnd, or orthopnea. Resp: No cough, hemoptysis, wheezing and shortness of breath. GI: No nausea, vomiting, diarrhea, constipation, melena or hematochezia. : No urinary obstruction, dysuria or hematuria. Derm: No rash, new skin lesion or pruritis. Musc/skeletal: no bone or joint complains. Vasc: No edema, cyanosis or claudication. Endo: No heat/cold intolerance, no polyuria,polydipsia or polyphagia. Neuro: No unilateral weakness, numbness, tingling. No seizures. Heme: No easy bruising or bleeding. Physical:   Patient Vitals for the past 6 hrs:   Temp Pulse Resp BP SpO2   11/05/19 1122 98.7 °F (37.1 °C) 63 16 106/47 98 %   11/05/19 0959    107/46          Exam:   General Appearance: Comfortable, not using accessory muscles of respiration. HEENT: NADINE. HEAD: Atraumatic  NECK: No JVD, no thyroidomeglay. CAROTIDS: clear  LUNGS: Clear bilaterally. HEART: S1+S2     ABD: Non-tender, BS Audible    EXT: No edema, and no cysnosis. VASCULAR EXAM: Pulses are intact. PSYCHIATRIC EXAM: Mood is appropriate. MUSCULOSKELETAL: Grossly no joint deformity. NEUROLOGICAL: Motor and sensory sytem intact and Cranial nerves II-XII intact.     Review of Data:   LABS:   Lab Results   Component Value Date/Time    WBC 8.7 11/05/2019 03:28 AM    HGB 12.3 11/05/2019 03:28 AM HCT 38.7 11/05/2019 03:28 AM    PLATELET 691 64/49/3529 03:28 AM     Lab Results   Component Value Date/Time    Sodium 143 11/05/2019 03:30 AM    Potassium 4.4 11/05/2019 03:30 AM    Chloride 111 11/05/2019 03:30 AM    CO2 26 11/05/2019 03:30 AM    Glucose 113 (H) 11/05/2019 03:30 AM    BUN 12 11/05/2019 03:30 AM    Creatinine 1.05 11/05/2019 03:30 AM     No results found for: CHOL, CHOLX, CHLST, CHOLV, HDL, HDLP, LDL, LDLC, DLDLP, TGLX, TRIGL, TRIGP  No results found for: GPT  No results found for: HBA1C, HGBE8, [id number], [id number]      Cardiology Procedures:   Results for orders placed or performed during the hospital encounter of 11/04/19   EKG, 12 LEAD, INITIAL   Result Value Ref Range    Ventricular Rate 119 BPM    Atrial Rate 326 BPM    QRS Duration 112 ms    Q-T Interval 288 ms    QTC Calculation (Bezet) 405 ms    Calculated R Axis -90 degrees    Calculated T Axis 51 degrees    Diagnosis       Atrial fibrillation with rapid ventricular response  Right bundle branch block  Inferior infarct , age undetermined  Anterolateral infarct , age undetermined  Abnormal ECG  No previous ECGs available  Confirmed by Heladio Hobbs MD. ([id number]) on 11/4/2019 11:27:59 PM             Impression / Plan:    Patient Active Problem List   Diagnosis Code    Urge incontinence N39.41    Stress incontinence N39.3    Adenocarcinoma of lung ([facility] C34.90    Benign essential hypertension I10    Chronic right-sided low back pain without sciatica M54.5, G89.29    Degeneration of intervertebral disc of lumbosacral region M51.37    Excessive cerumen in ear canal, left H61.22    Exposure of implanted vaginal mesh and other prosthetic materials into vagina T83.721A, T83.729A    Frequency of micturition R35.0    Gastroesophageal reflux disease K21.9    History of tobacco use Z87.891    Hypercholesterolemia E78.00    Hyperlipidemia E78.5    Hypertensive heart disease I11.9    Mixed anxiety depressive disorder F41.8    Morbid obesity (HCC) E66.01    Osteoarthritis of hip M16.9    Osteopenia M85.80    Vitamin D deficiency E55.9    Atrial fibrillation with RVR ([facility]) I48.91         A/P  Chest pain  PAFIB now converted to NSR - [id number]- VASC score is 3  Elevated trop  Moderate AR  Minimal MS      Plan    Stress test tomorrow  Discussed with pt and  about anticoagulation, prefer aspirin because of history of easy bruisability/intermittent history of rectal bleed. Continue with aggressive BP control and risk factors management.               Signed By: Joseluis Paul MD     November 5, 2019

## 2019-11-05 NOTE — PROGRESS NOTES
Reason for Admission:   C/o chest pain                  RRAT Score:     17             Do you (patient/family) have any concerns for transition/discharge? Not at this time              Plan for utilizing home health:   TBD    Current Advanced Directive/Advance Care Plan:  Not on chart          Transition of Care Plan: chart reviewed per ED notes pt ambulated to ED with c/o sudden onset of SOB and chest discomfort, pt admitted for A-Fib and elevated troponin, cm will attend IDR's and initiate d/c planning after speaking with patient. Care Management Interventions  PCP Verified by CM: Yes  Palliative Care Criteria Met (RRAT>21 & CHF Dx)?: No  Physical Therapy Consult: Yes  Current Support Network: Other     1300- cm met with pt and spouse at bedside,cm explained cm role as d/c planner,pt stated she is independent with care denies using home DME's, when discharged pt would like to follow up with her spouses cardiologist in [place] uncertain of name believes its  spouse will verify when he goes home, no PT recommendations at this time, pt plan to go to cardiac rehab as recommended, cm will remain available.

## 2019-11-05 NOTE — PROGRESS NOTES
INITIAL NUTRITION ASSESSMENT     RECOMMENDATIONS/PLAN:   -Unable to assess calories at this time due to pt being NPO  -Adv diet per MD   -Monitor labs/lytes, BM, skin integrity, wt, fluid status    REASON FOR ASSESSMENT:   [x]  MD Consult:    [] General Nutrition Management and Supplements   [] Management of Tube Feeding   [x] Calorie Count    [] Diet Education    NUTRITION ASSESSMENT:   Client History: 71 yrs old Female admitted with SOB, chest pain after bladder procedure  PMHx: adenocarcinoma of lung, arthritis, arthropathy, bladder pain, cardiac arrhythmia, degeneration of intervertebral disc of lumbosacral region, difficulty urinating, dysphagia, dysuria, enthesopathy of hip region, esophogeal reflux, frequency of micturition, exposure of implanted vaginal mesh, frequency of micturition, GERD, HTN, hypertensive heart DZ, lung cancer, nocturia, osteopenia,urinary frequency   Cultural/[employer] Food Preferences: None Identified    FOOD/NUTRITION HISTORY  Diet History: pt appetite is okay. Says she eats like a bird and only eats when she wants. She usually has a slim fast or special K for breakfast and eats a large combined lunch and dinner around 4:30. Food Allergies:  [] NKFA     [x] Yes (latex)   Pertinent PTA Medications: colace, cholecalciferol, coreg, oxycodone     NUTRITION INTAKE   Diet Order:  NPO      Average PO Intake:       No data found.    Pertinent Medications:  [x] Reviewed; coreg, colace, lipitor, zofran  Electrolyte Replacement Protocol: []K  []Mg  []PO4    Insulin:  [] SSI  [] Pre-meal   []  Basal   [] Drip  [x] None  Pt expected to meet estimated nutrient needs through next review:          [x]  Yes     [] No;  ANTHROPOMETRICS  Height: 5' 3\" (160 cm)       Weight: 116.8 kg (257 lb 8 oz)    BMI: 45.6 kg/m^2  -  morbidly obese (Greater than or = to 40% BMI)        Weight change: no recent wt history                                  Comparison to Reference Standards:  IBW: 115 lbs      %IBW: 223% AdjBW: 68.4 kg    NUTRITION-FOCUSED PHYSICAL ASSESSMENT  Skin: no PU    GI: no BM    BIOCHEMICAL DATA & MEDICAL TESTS  Pertinent Labs:  [x] Reviewed; GFR est non-AA    NUTRITION PRESCRIPTION  Calories: 2103-7151 kcal/day based on 35-40 kcal/kg  Protein: 42 g/day based on .8 g/kg  Fluid: 7176-9162 ml/day based on 1 kcal/ml      NUTRITION DIAGNOSES:   1.No nutrition diagnosis at this time      NUTRITION INTERVENTIONS:   INTERVENTIONS:        GOALS:  1. Adv diet per MD 1. Adv diet per MD by next review 7 days     LEARNING NEEDS (Diet, Supplementation, Food/Nutrient-Drug Interaction):   [] None Identified  [] Inpatient education provided/documented    [] Identified and patient:  [] Declined     [x] Was not appropriate/indicated  NUTRITION MONITORING /EVALUATION:   Follow PO intake  Monitor wt  Monitor renal labs, electrolytes, fluid status  Monitor for additional supplement needs    [x] Participated in Interdisciplinary Rounds  [x] Interdisciplinary Care Plan Reviewed/Documented  DISCHARGE NUTRITION RECOMMENDATIONS ADDRESSED:      [x] To be determined closer to discharge    NUTRITION RISK:     []  At risk                     [x]  Not currently at risk     Will follow-up per policy.   Jenni Fitch 1

## 2019-11-05 NOTE — H&P
History & Physical    Patient: Zarina Luong MRN: [id number]  CSN: [id number]    YOB: 1955  Age: 71 y.o. Sex: female      DOA: 11/4/2019    Chief Complaint:   Chief Complaint   Patient presents with    Shortness of Breath    Chest Pain          HPI:     Zarina Luong is a 71 y.o.  female who history of atrial fibrillation, lung cancer post chemo and XRT, bladder incontinence with recent removal of mesh, degenerative arthritis presents to the emergency room with chest pain associated with shortness of breath and palpitations symptoms started today after having cystoscopy done at urology office in [place]. complains of mid sternal chest pain that is like \"a hammer hitting me in the chest.\" Pt clutching at chest   Upon arrival to the emergency room she was found to be in rapid ventricular atrial fib with a heart rate of 160 she was given 10 mg of IV Cardizem with hypotension which improved with fluid was then given IV digoxin 250 mcg with slowing of heart rate down to about 120 she was later found to have slight elevation in troponin asked to admit for further control of heart rate and elevated troponin; started on heparin drip in ER   After cardiology consultation   Patient had history of atrial fibrillation in the  past has been on aspirin and Coreg also had a stress test about a year ago which was essentially normal CT scan done in the emergency room is negative for pulmonary embolism; noted abnormalities of lung, kidney  And adrenal glands. Past Medical History:   Diagnosis Date    Adenocarcinoma of lung ([facility] [address] 4/14/2008    Arthritis     Arthropathy     Bladder pain     Cardiac arrhythmia     Chronic right-sided low back pain without sciatica 4/23/2014    Degeneration of intervertebral disc of lumbosacral region 8/24/2009    Last Assessment & Plan:    Patient will be continued on oxycodone acetaminophen  7.5-325 mg tablets as directed. Total of 120 dispensed month.   Patient is on a quarterly visiting schedule for her appointments  As per contract.  Difficulty urinating     Dysphagia 2017    Dysuria 2016    Enthesopathy of hip region 2009    Esophageal reflux     Excessive cerumen in ear canal, left 10/11/2017    Exposure of implanted vaginal mesh and other prosthetic materials into vagina 5/15/2012    Frequency of micturition 2012    Gastroesophageal reflux disease 2004    History of blood transfusion     HTN (hypertension)     Hypertensive heart disease 2015    Lung cancer ([facility] [address]     Malignant neoplasm without specification of site ([facility] [address]     Nocturia     Osteopenia 2012    SOB (shortness of breath)     Stress incontinence 10/13/2017    Urge incontinence     Urinary frequency        Past Surgical History:   Procedure Laterality Date    HX  SECTION      HX UROLOGICAL  2017    Cysto, injection for chemodenervation of bladder, endoscopic injection of bulking material for urinary incontinence. Dr. Kevin Cervantes, [facility]. History reviewed. No pertinent family history. Social History     Socioeconomic History    Marital status:      Spouse name: Not on file    Number of children: Not on file    Years of education: Not on file    Highest education level: Not on file   Tobacco Use    Smoking status: Former Smoker     Years: 15.00     Types: Cigarettes    Smokeless tobacco: Never Used   Substance and Sexual Activity    Alcohol use: No    Drug use: No       Prior to Admission medications    Medication Sig Start Date End Date Taking? Authorizing Provider   nitroglycerin (NITROSTAT) 0.4 mg SL tablet 0.4 mg. 14   Provider, Historical   docusate sodium (COLACE) 100 mg capsule EQ STOOL SOFTENER CAPS 14   Provider, Historical   dilTIAZem CD (CARTIA XT) 240 mg ER capsule Take 1 Cap by mouth.  13   Provider, Historical   clotrimazole-betamethasone (LOTRISONE) topical cream CLOTRIMAZOLE-BETAMETHASONE 1-0.05 % CREA 1/21/14   Provider, Historical   cholecalciferol, vitamin D3, 2,000 unit tab Take 1 Tab by mouth. 3/31/14   Provider, Historical   carvedilol (COREG) 6.25 mg tablet 6.25 mg. 5/16/14   Provider, Historical   busPIRone (BUSPAR) 5 mg tablet Take 5 mg by mouth. 3/29/13   Provider, Historical   aspirin delayed-release 81 mg tablet Take 1 Tab by mouth. 11/22/11   Provider, Historical   albuterol (PROAIR HFA) 90 mcg/actuation inhaler PROAIR  (90 Base) MCG/ACT AERS 7/28/11   Provider, Historical   nystatin (MYCOSTATIN) powder NYSTATIN 790323 UNIT/GM POWD 8/7/14   Provider, Historical   omega-3 acid ethyl esters (LOVAZA) 1 gram capsule FISH OIL 1000 MG CAPS 1/23/17   Provider, Historical   omeprazole (PRILOSEC) 20 mg capsule Take 20 mg by mouth. 5/30/17   Provider, Historical   oxyCODONE-acetaminophen (PERCOCET) 7.5-325 mg per tablet Take 1 Tab by mouth. 10/2/17   Provider, Historical   potassium chloride SR (KLOR-CON 10) 10 mEq tablet Take 1 Tab by mouth. 5/16/14   Provider, Historical   pravastatin (PRAVACHOL) 40 mg tablet Take 40 mg by mouth. 5/30/17   Provider, Historical   promethazine (PHENERGAN) 25 mg tablet Take 25 mg by mouth. 9/27/17   Provider, Historical   spironolactone (ALDACTONE) 25 mg tablet Take 1 Tab by mouth. 8/4/14   Provider, Historical   venlafaxine-SR (EFFEXOR XR) 75 mg capsule Take 225 mg by mouth. 6/29/17   Provider, Historical       Allergies   Allergen Reactions    Latex Hives    Codeine Unknown (comments) and Anaphylaxis    Sulfa (Sulfonamide Antibiotics) Anaphylaxis    Ibuprofen Unknown (comments)    Nitrofurantoin Other (comments)    Penicillin G Unknown (comments)    Penicillins Hives    Sulfasalazine Unknown (comments)    Tramadol Other (comments) and Nausea and Vomiting         Review of Systems  GENERAL: Patient alert, awake and oriented times 3, able to communicate full sentences and not in distress.    HEENT: No change in vision, no earache, tinnitus, sore throat or sinus congestion. NECK: No pain or stiffness. PULMONARY: + shortness of breath, cough or wheeze. Cardiovascular: no pnd or orthopnea, +CP  GASTROINTESTINAL: No abdominal pain, nausea, vomiting or diarrhea, melena or bright red blood per rectum. GENITOURINARY: No urinary frequency, urgency, hesitancy or dysuria. MUSCULOSKELETAL+ joint or muscle pain, no back pain, no recent trauma. DERMATOLOGIC+rash, no itching, no lesions. ENDOCRINE: No polyuria, polydipsia, no heat or cold intolerance. No recent change in weight. HEMATOLOGICAL: No anemia or easy bruising or bleeding. NEUROLOGIC: No headache, seizures, numbness, tingling or weakness. Physical Exam:     Physical Exam:  Visit Vitals  /77   Pulse (!) 108   Temp 97.9 °F (36.6 °C)   Resp 11   Ht 5' 3\" (1.6 m)   Wt 111.7 kg (246 lb 4.8 oz)   SpO2 98%   BMI 43.63 kg/m²      O2 Device: Room air    Temp (24hrs), Av.9 °F (36.6 °C), Min:97.9 °F (36.6 °C), Max:97.9 °F (36.6 °C)    No intake/output data recorded.  0701 -  1900  In: 1000 [I.V.:1000]  Out: -     General:  Alert, cooperative, no distress, appears stated age. Head: Normocephalic, without obvious abnormality, atraumatic. Eyes:  Conjunctivae/corneas clear. PERRL, EOMs intact. Nose: Nares normal. No drainage or sinus tenderness. Neck: Supple, symmetrical, trachea midline, no adenopathy, thyroid: no enlargement, no carotid bruit and no JVD. Lungs:   Clear to auscultation bilaterally. Heart:  irregularRegular rate and rhythm, S1, S2 normal.     Abdomen: Soft, non-tender. Bowel sounds normal.    Extremities: Extremities normal, atraumatic, no cyanosis or edema. Pulses: 2+ and symmetric all extremities. Skin:  + rashes or lesions umbillical area   Neurologic: AAOx3, No focal motor or sensory deficit. Labs Reviewed: All lab results for the last 24 hours reviewed.    and EKG    Procedures/imaging: see electronic medical records for all procedures/Xrays and details which were not copied into this note but were reviewed prior to creation of Plan    CT Results  (Last 48 hours)               11/04/19 2123  CTA CHEST W OR W WO CONT Final result    Impression:  IMPRESSION:       1. No evidence of pulmonary embolism. 2.  No consolidation. 3. Indeterminate 3 mm right lower lobe nodule. Please see below Fleischner   Thoracic Society 2017 recommendations regarding follow-up of pulmonary nodules   of this size:   Single Nodule   A. Low risk:      < 6 mm: No routine follow up unless suspicious nodule morphology, upper lobe   location or both   B. High smoking or other exposure risk:      < 6 mm: No routine follow up. If suspicious nodule morphology, upper lobe   location or both, then F/U CT at 12 mo.       4. Indeterminate left adrenal lesion, statistically likely adenoma unless there   is known primary carcinoma. 5. Strictly indeterminate right renal hypodensity, perhaps hemorrhagic cyst.   Potentially routine outpatient follow-up renal ultrasound may better   characterize. Narrative:  EXAM: CTA CHEST       INDICATION: Midsternal chest pain, shortness of breath. Possible PE.       COMPARISON: No prior study. TECHNIQUE: Axial CT imaging from the thoracic inlet through the diaphragm with   intravenous contrast utilizing CTA study for pulmonary artery evaluation. Coronal and sagittal MIP reformations were generated at a separate workstation. One or more dose reduction techniques were used on this CT: automated exposure   control, adjustment of the mAs and/or kVp according to patient's size, and   iterative reconstruction techniques. The specific techniques utilized on this CT   exam have been documented in the patient's electronic medical record.  Digital   Imaging and Communications in Medicine (DICOM) format image data are available   to nonaffiliated external healthcare facilities or entities on a secure, media   free, reciprocally searchable basis with patient authorization for at least a   12-month period after this study. _______________       FINDINGS:       EXAM QUALITY: Overall exam quality is adequate. Pulmonary arterial enhancement   is adequate. The breath hold is satisfactory. PULMONARY ARTERIES: No convincing evidence of pulmonary embolism. MEDIASTINUM: Minimal atherosclerotic calcifications are present thoracic aorta. Minimal pericardial effusion is present. PLEURA: Unremarkable. LYMPH NODES: No enlarged lymph nodes by size criteria. LUNGS/AIRWAY: 3 mm nodule is present superior segment right lower lobe axial   image 46. Calcified granuloma is present anteriorly in the right middle lobe axial image   67. 3 mm lateral subpleural right middle lobe nodular density is present on   axial image 74, nonspecific but likely intrapulmonary lymph node. No consolidation is present. UPPER ABDOMEN: 18 mm hypodensity is present associated with the lateral limb   left adrenal gland, indeterminate on this study with Hounsfield units in the   20s. Additional 13 mm right renal upper pole hypodensity, exophytic, is present   with Hounsfield units also indeterminate. OSSEOUS STRUCTURES: Degenerative changes are seen in the spine. OTHER: None   _______________               Assessment/Plan     Active Problems:    * No active hospital problems. *  Impression and plan   #1 rapid ventricular response atrial fibrillation we will rate control on Coreg and digoxin she is now on a heparin drip will also check cardiac enzymes x3 and an echocardiogram cardiology has been consulted to the emergency room she may need stress test versus cath    2.  Elevated troponin we will trend troponin x3 start on cholesterol regimen continue Coreg continue heparin drip    3. Urinary incontinence  Check urine cultures trial of pure wick    4.   New adrenal mass as well as lung nodule i as well as renal lesion in a patient with history of lung cancer can follow-up as outpatient with hematology    DVT/GI Prophylaxis: Heparin drip    Discussed with patient at bedside about hospital admission and my plan care, who understood and agree with my plan care.     Ayo Greenwood MD  11/4/2019 9:53 PM

## 2019-11-05 NOTE — ED NOTES
Pt having N/V. Pt reports nausea after pain medication. Pt denies CP at this time.  notified. New orders received.

## 2019-11-05 NOTE — PROGRESS NOTES
Received bedside report from Hipolito Gay RN. Pt Aox4, SR on monitor, RA, NPO, Heparin drip verified, pt resting in bed/bed in low position, wheels locked. 1900-Bedside and Verbal shift change report given to Frank Thomas RN (oncoming nurse) by Saint Martin RN (offgoing nurse). Report included the following information SBAR, Kardex, MAR and Cardiac Rhythm SR/BBB.

## 2019-11-05 NOTE — PROGRESS NOTES
Hospitalist Progress Note    Patient: Regina Sun MRN: [id number]  CSN: [id number]    YOB: 1955  Age: 71 y.o.   Sex: female    DOA: 11/4/2019 LOS:  LOS: 1 day          Chief Complaint:    Chest pain      Assessment/Plan   70 yo lady with hx PAF, lung ca s/p chemo and XRT presented with crushing chest pain, afib with RVR    Afib with RVR -improved rate-stabilized, and resolved chest pain    Elevated troponin -continue heparin drip     Urinary incontinence  Check urine cultures trial of pure wick     New adrenal mass as well as lung nodule i as well as renal lesion in a patient with history of lung cancer can follow-up as outpatient with hematology/oncology-discussed with patient    Plan:echo results pending  Cardiology consult  May need stress testing here  Ordered cardiac diet for now    Continue heparin gtt  Continue beta blocker, statin and home meds  Tele monitoring      Disposition :1-2 days  Patient Active Problem List   Diagnosis Code    Urge incontinence N39.41    Stress incontinence N39.3    Adenocarcinoma of lung ([address] C34.90    Benign essential hypertension I10    Chronic right-sided low back pain without sciatica M54.5, G89.29    Degeneration of intervertebral disc of lumbosacral region M51.37    Excessive cerumen in ear canal, left H61.22    Exposure of implanted vaginal mesh and other prosthetic materials into vagina T83.721A, T83.729A    Frequency of micturition R35.0    Gastroesophageal reflux disease K21.9    History of tobacco use Z87.891    Hypercholesterolemia E78.00    Hyperlipidemia E78.5    Hypertensive heart disease I11.9    Mixed anxiety depressive disorder F41.8    Morbid obesity ([address] E66.01    Osteoarthritis of hip M16.9    Osteopenia M85.80    Vitamin D deficiency E55.9    Atrial fibrillation with RVR ([address] I48.91       Subjective:    Feels better  Wants to go home    Review of systems:    Constitutional: denies fevers, chills  Respiratory: denies SOB, cough  Cardiovascular: denies chest pain, palpitations  Gastrointestinal: denies nausea      Vital signs/Intake and Output:  Visit Vitals  /47   Pulse 63   Temp 98.7 °F (37.1 °C)   Resp 16   Ht 5' 3\" (1.6 m)   Wt 111.6 kg (246 lb)   SpO2 98%   Breastfeeding? No   BMI 43.58 kg/m²     Current Shift:  No intake/output data recorded. Last three shifts:  11/03 1901 - 11/05 0700  In: 1053.5 [I.V.:1053.5]  Out: -     Exam:    General: Well developed, obese WF, alert, NAD, OX3  Head/Neck: NCAT, supple, No masses, No lymphadenopathy  CVS:irreg rhythm, reg rate, no M/R/G, S1/S2 heard, no thrill  Lungs:Clear to auscultation bilaterally, no wheezes, rhonchi, or rales  Abdomen: Soft, Nontender, No distention, Normal Bowel sounds, No hepatomegaly  Extremities: No C/C/E, pulses palpable 2+  Neuro:grossly normal , follows commands  Psych:appropriate                Labs: Results:       Chemistry Recent Labs     11/05/19  0330 11/04/19  1641   * 122*    143   K 4.4 4.1    109   CO2 26 26   BUN 12 15   CREA 1.05 1.09   CA 9.2 9.8   AGAP 6 8   BUCR 11* 14   AP 88 110   TP 6.6 8.2   ALB 3.0* 3.8   GLOB 3.6 4.4*   AGRAT 0.8 0.9      CBC w/Diff Recent Labs     11/05/19  0328 11/04/19  1641   WBC 8.7 8.1   RBC 4.36 5.01   HGB 12.3 14.3   HCT 38.7 44.7    294   GRANS 50 53   LYMPH 36 33   EOS 2 3      Cardiac Enzymes Recent Labs     11/05/19  0950 11/05/19  0329    176   CKND1 3.9 4.0      Coagulation Recent Labs     11/05/19  0950 11/05/19  0328   PTP  --  13.1   INR  --  1.0   APTT 62.1* 64.5*       Lipid Panel No results found for: CHOL, CHOLPOCT, CHOLX, CHLST, CHOLV, 781041, HDL, HDLP, LDL, LDLC, DLDLP, 211399, VLDLC, VLDL, TGLX, TRIGL, TRIGP, TGLPOCT, CHHD, CHHDX   BNP No results for input(s): BNPP in the last 72 hours.    Liver Enzymes Recent Labs     11/05/19  0330   TP 6.6   ALB 3.0*   AP 88   SGOT 17      Thyroid Studies Lab Results   Component Value Date/Time    TSH 6.92 (H) 11/05/2019 03:29 AM        Procedures/imaging: see electronic medical records for all procedures/Xrays and details which were not copied into this note but were reviewed prior to creation of Shanell Frank MD

## 2019-11-05 NOTE — PROGRESS NOTES
@3808 pt admitted from ED on room air awake alert and oriented x4, denies pain at present. vital signs stable. Remains on heparin drip verified with ED nurse. Placed on pure wick as requested by Dr. Екатерина Braxton. Rash observed below bilateral breast and umbilicus. Assessment done and charted in appropriate flow sheets. Nursing management continues. @4709 pt reassessed no new developments  Care continues. @0600 no new developments observation continues. @7484 Bedside and Verbal shift change report given to Sundeep Cameron (oncoming nurse) by Howard Rothman RN (offgoing nurse). Report included the following information SBAR, Kardex, Intake/Output, MAR, Recent Results, Med Rec Status and Alarm Parameters .

## 2019-11-05 NOTE — PROGRESS NOTES
PHYSICAL THERAPY EVALUATION AND DISCHARGE    Patient: Rosario Ulloa (48 y.o. female)  Date: 11/5/2019  Primary Diagnosis: Atrial fibrillation with RVR ([facility]) [I48.91]        Precautions:   (standard)    PLOF: Independent with mobility including gait no AD. ASSESSMENT :  PT orders received and patient cleared by EMR review to participate with therapy. Patient is a 71 y.o. female admitted to the hospital due to SOB and chest pain. Patient consents to PT evaluation and treatment. Pt has no SOB or chest pain throughout session. Pt has functional B LE strength and good sitting/standing balance. Pt is independent and safe with bed mobility, transfers, and gait. Pt educated on safety while in the hospital and at home. Pt reports she is at her baseline for mobility. Pt ended therapy sitting edge of bed with all needs met. Patient does not require further skilled physical therapy at this level of care. PLAN :  Recommendations and Planned Interventions:    No skilled inpatient physical therapy needs identified at this time. Discharge Recommendations: None  Further Equipment Recommendations for Discharge: N/A     SUBJECTIVE:   Patient stated I move around.     OBJECTIVE DATA SUMMARY:     Past Medical History:   Diagnosis Date    Adenocarcinoma of lung ([address] 4/14/2008    Arthritis     Arthropathy     Bladder pain     Cardiac arrhythmia     Chronic right-sided low back pain without sciatica 4/23/2014    Degeneration of intervertebral disc of lumbosacral region 8/24/2009    Last Assessment & Plan:    Patient will be continued on oxycodone acetaminophen  7.5-325 mg tablets as directed. Total of 120 dispensed month. Patient is on a quarterly visiting schedule for her appointments  As per contract.     Difficulty urinating     Dysphagia 1/26/2017    Dysuria 2/11/2016    Enthesopathy of hip region 1/8/2009    Esophageal reflux     Excessive cerumen in ear canal, left 10/11/2017    Exposure of implanted vaginal mesh and other prosthetic materials into vagina 5/15/2012    Frequency of micturition 2012    Gastroesophageal reflux disease 2004    History of blood transfusion     HTN (hypertension)     Hypertensive heart disease 2015    Lung cancer ([facility] [address]     Malignant neoplasm without specification of site [facility])     Nocturia     Osteopenia 2012    SOB (shortness of breath)     Stress incontinence 10/13/2017    Urge incontinence     Urinary frequency      Past Surgical History:   Procedure Laterality Date    HX  SECTION      HX UROLOGICAL  2017    Cysto, injection for chemodenervation of bladder, endoscopic injection of bulking material for urinary incontinence. Dr. Prem Fletcher, [facility]. Barriers to Learning/Limitations: None  Compensate with: N/A  Home Situation:   Home Situation  Home Environment: Private residence  # Steps to Enter: 5  Rails to Enter: Yes  Hand Rails : Bilateral  One/Two Story Residence: One story  Living Alone: No  Support Systems: Spouse/Significant Other/Partner, Child(lizeth), Friends \ neighbors  Patient Expects to be Discharged to[de-identified] Private residence  Current DME Used/Available at Home: Samuella Land, rolling, Cane, straight  Critical Behavior:  Neurologic State: Alert  Orientation Level: Oriented X4  Cognition: Follows commands  Safety/Judgement: Fall prevention  Psychosocial  Patient Behaviors: Appropriate for age; Cooperative;Calm  Family  Behaviors: Calm; Cooperative  Purposeful Interaction: Yes  Pt Identified Daily Priority: Clinical issues (comment)  Caritas Process: Nurture loving kindness;Establish trust;Teaching/learning; Attend basic human needs;Create healing environment;Supportive expression  Caring Interventions: Reassure; Therapeutic modalities  Reassure: Therapeutic listening; Informing; Acceptance;Quiet presence;Caring rounds  Therapeutic Modalities: Deep breathing; Intentional therapeutic touch     Family  Behaviors: Calm; Cooperative           B LE Strength: Strength: Within functional limits              B LE Tone & Sensation:   Tone: Normal          Sensation: Intact           B LE Range Of Motion:  AROM: Within functional limits                 Posture:  Posture (WDL): Exceptions to WDL  Posture Assessment: Forward head;Kyphosis;Rounded shoulders  Functional Mobility:  Bed Mobility:     Supine to Sit: (pt reports no issues)        Transfers:  Sit to Stand: Modified independent  Stand to Sit: Modified independent                       Balance:   Sitting: Intact  Standing: Intact    Ambulation/Gait Training:  Distance (ft): 300 Feet (ft)  Assistive Device: (none)  Ambulation - Level of Assistance: Independent  Base of Support: Widened     Speed/Anny: Pace decreased (<100 feet/min)  Step Length: Right shortened;Left shortened                 Stairs:   Pt reports no issues            Therapeutic Exercises:   Reviewed and performed ankle pumps. Pain:  Pain level pre-treatment: 3-4/10 back  Pain level post-treatment: 3-4/10 back  Pain Intervention(s): Medication (see MAR); Rest, Repositioning   Response to intervention: Nurse notified, See doc flow    Activity Tolerance:   good  Please refer to the flowsheet for vital signs taken during this treatment. After treatment:   ?         Patient left in no apparent distress sitting up in chair  ? Patient left in no apparent distress in bed  ? Call bell left within reach  ? Personal items in reach  ? Nursing notified [place]  ? Caregiver present  ? Bed/chair alarm activated  ? SCDs applied       COMMUNICATION/EDUCATION:   ?         Role of Physical Therapy in the acute care setting. ?         Fall prevention education was provided and the patient/caregiver indicated understanding. ? Patient/family have participated as able in goal setting and plan of care. ?         Patient/family agree to work toward stated goals and plan of care.   ?         Patient understands intent and goals of therapy, but is neutral about his/her participation. ? Patient is unable to participate in goal setting/plan of care: ongoing with therapy staff. ?         Out of bed at least 3-5 times a day. ? Other:     Thank you for this referral.  Kannan Bob, PT, DPT   Time Calculation: 14 mins      Eval Complexity: History: MEDIUM  Complexity : 1-2 comorbidities / personal factors will impact the outcome/ POC Exam:HIGH Complexity : 4+ Standardized tests and measures addressing body structure, function, activity limitation and / or participation in recreation  Presentation: LOW Complexity : Stable, uncomplicated  Clinical Decision Making:Low Complexity    Overall Complexity:LOW

## 2019-11-06 ENCOUNTER — APPOINTMENT (OUTPATIENT)
Dept: NUCLEAR MEDICINE | Age: 64
DRG: 281 | End: 2019-11-06
Attending: INTERNAL MEDICINE
Payer: MEDICARE

## 2019-11-06 ENCOUNTER — APPOINTMENT (OUTPATIENT)
Dept: NON INVASIVE DIAGNOSTICS | Age: 64
DRG: 281 | End: 2019-11-06
Attending: INTERNAL MEDICINE
Payer: MEDICARE

## 2019-11-06 PROBLEM — R94.39 ABNORMAL NUCLEAR STRESS TEST: Status: ACTIVE | Noted: 2019-11-04

## 2019-11-06 PROBLEM — I48.91 A-FIB (HCC): Status: ACTIVE | Noted: 2019-11-04

## 2019-11-06 PROBLEM — R06.09 DOE (DYSPNEA ON EXERTION): Status: ACTIVE | Noted: 2019-11-04

## 2019-11-06 LAB
APTT PPP: 36 SEC (ref 23–36.4)
BASOPHILS # BLD: 0 K/UL (ref 0–0.1)
BASOPHILS NFR BLD: 1 % (ref 0–2)
CHOLEST SERPL-MCNC: 196 MG/DL
DIFFERENTIAL METHOD BLD: ABNORMAL
EOSINOPHIL # BLD: 0.2 K/UL (ref 0–0.4)
EOSINOPHIL NFR BLD: 3 % (ref 0–5)
ERYTHROCYTE [DISTWIDTH] IN BLOOD BY AUTOMATED COUNT: 14.2 % (ref 11.6–14.5)
HCT VFR BLD AUTO: 39.8 % (ref 35–45)
HDLC SERPL-MCNC: 37 MG/DL (ref 40–60)
HDLC SERPL: 5.3 {RATIO} (ref 0–5)
HGB BLD-MCNC: 12.3 G/DL (ref 12–16)
LDLC SERPL CALC-MCNC: 116.6 MG/DL (ref 0–100)
LIPID PROFILE,FLP: ABNORMAL
LYMPHOCYTES # BLD: 2.2 K/UL (ref 0.9–3.6)
LYMPHOCYTES NFR BLD: 38 % (ref 21–52)
MCH RBC QN AUTO: 27.9 PG (ref 24–34)
MCHC RBC AUTO-ENTMCNC: 30.9 G/DL (ref 31–37)
MCV RBC AUTO: 90.2 FL (ref 74–97)
MONOCYTES # BLD: 0.6 K/UL (ref 0.05–1.2)
MONOCYTES NFR BLD: 10 % (ref 3–10)
NEUTS SEG # BLD: 2.9 K/UL (ref 1.8–8)
NEUTS SEG NFR BLD: 48 % (ref 40–73)
PLATELET # BLD AUTO: 255 K/UL (ref 135–420)
PMV BLD AUTO: 10.2 FL (ref 9.2–11.8)
RBC # BLD AUTO: 4.41 M/UL (ref 4.2–5.3)
TRIGL SERPL-MCNC: 212 MG/DL (ref ?–150)
VLDLC SERPL CALC-MCNC: 42.4 MG/DL
WBC # BLD AUTO: 5.9 K/UL (ref 4.6–13.2)

## 2019-11-06 PROCEDURE — 74011250636 HC RX REV CODE- 250/636: Performed by: INTERNAL MEDICINE

## 2019-11-06 PROCEDURE — A9500 TC99M SESTAMIBI: HCPCS

## 2019-11-06 PROCEDURE — 74011000250 HC RX REV CODE- 250: Performed by: INTERNAL MEDICINE

## 2019-11-06 PROCEDURE — 93017 CV STRESS TEST TRACING ONLY: CPT

## 2019-11-06 PROCEDURE — 85730 THROMBOPLASTIN TIME PARTIAL: CPT

## 2019-11-06 PROCEDURE — 80061 LIPID PANEL: CPT

## 2019-11-06 PROCEDURE — 85025 COMPLETE CBC W/AUTO DIFF WBC: CPT

## 2019-11-06 PROCEDURE — 74011250637 HC RX REV CODE- 250/637: Performed by: INTERNAL MEDICINE

## 2019-11-06 PROCEDURE — 65660000000 HC RM CCU STEPDOWN

## 2019-11-06 PROCEDURE — 74011250637 HC RX REV CODE- 250/637: Performed by: HOSPITALIST

## 2019-11-06 PROCEDURE — 36415 COLL VENOUS BLD VENIPUNCTURE: CPT

## 2019-11-06 RX ORDER — ENOXAPARIN SODIUM 150 MG/ML
1 INJECTION SUBCUTANEOUS EVERY 12 HOURS
Status: DISCONTINUED | OUTPATIENT
Start: 2019-11-07 | End: 2019-11-07

## 2019-11-06 RX ORDER — ACETAMINOPHEN 325 MG/1
650 TABLET ORAL
Status: DISCONTINUED | OUTPATIENT
Start: 2019-11-06 | End: 2019-11-08 | Stop reason: HOSPADM

## 2019-11-06 RX ADMIN — Medication 10 ML: at 22:27

## 2019-11-06 RX ADMIN — Medication 10 ML: at 06:49

## 2019-11-06 RX ADMIN — NEOMYCIN AND POLYMYXIN B SULFATES AND BACITRACIN ZINC 1 PACKET: 400; 3.5; 5 OINTMENT TOPICAL at 09:00

## 2019-11-06 RX ADMIN — DOCUSATE SODIUM 100 MG: 100 CAPSULE, LIQUID FILLED ORAL at 21:05

## 2019-11-06 RX ADMIN — PRAVASTATIN SODIUM 40 MG: 20 TABLET ORAL at 21:05

## 2019-11-06 RX ADMIN — ASPIRIN 81 MG: 81 TABLET, COATED ORAL at 12:57

## 2019-11-06 RX ADMIN — ENOXAPARIN SODIUM 110 MG: 120 INJECTION SUBCUTANEOUS at 03:34

## 2019-11-06 RX ADMIN — NEOMYCIN AND POLYMYXIN B SULFATES AND BACITRACIN ZINC 1 PACKET: 400; 3.5; 5 OINTMENT TOPICAL at 21:00

## 2019-11-06 RX ADMIN — BUSPIRONE HYDROCHLORIDE 5 MG: 5 TABLET ORAL at 21:05

## 2019-11-06 RX ADMIN — SPIRONOLACTONE 25 MG: 25 TABLET ORAL at 12:58

## 2019-11-06 RX ADMIN — DOCUSATE SODIUM 100 MG: 100 CAPSULE, LIQUID FILLED ORAL at 12:58

## 2019-11-06 RX ADMIN — POTASSIUM CHLORIDE 10 MEQ: 10 TABLET, EXTENDED RELEASE ORAL at 12:57

## 2019-11-06 RX ADMIN — CARVEDILOL 6.25 MG: 3.12 TABLET, FILM COATED ORAL at 18:00

## 2019-11-06 RX ADMIN — ENOXAPARIN SODIUM 110 MG: 120 INJECTION SUBCUTANEOUS at 15:24

## 2019-11-06 RX ADMIN — OMEPRAZOLE 20 MG: 20 CAPSULE, DELAYED RELEASE ORAL at 12:58

## 2019-11-06 RX ADMIN — NYSTATIN: 100000 POWDER TOPICAL at 22:25

## 2019-11-06 RX ADMIN — Medication 10 ML: at 13:00

## 2019-11-06 RX ADMIN — NYSTATIN: 100000 POWDER TOPICAL at 12:59

## 2019-11-06 RX ADMIN — BUSPIRONE HYDROCHLORIDE 5 MG: 5 TABLET ORAL at 15:24

## 2019-11-06 RX ADMIN — ACETAMINOPHEN 650 MG: 325 TABLET ORAL at 08:43

## 2019-11-06 RX ADMIN — ACETAMINOPHEN 650 MG: 325 TABLET ORAL at 18:42

## 2019-11-06 RX ADMIN — VENLAFAXINE HYDROCHLORIDE 225 MG: 150 CAPSULE, EXTENDED RELEASE ORAL at 12:57

## 2019-11-06 RX ADMIN — REGADENOSON 0.4 MG: 0.08 INJECTION, SOLUTION INTRAVENOUS at 11:10

## 2019-11-06 RX ADMIN — CARVEDILOL 6.25 MG: 3.12 TABLET, FILM COATED ORAL at 12:57

## 2019-11-06 RX ADMIN — BUSPIRONE HYDROCHLORIDE 5 MG: 5 TABLET ORAL at 12:58

## 2019-11-06 NOTE — PROGRESS NOTES
NUTRITION UPDATE    - spoke w/ pt she had the chips and ice cream from her lunch today for a total of 264kcal at lunch    She did not like the potato salad       Amari Quintanilla, WALLACE  PAGER:  [phone]

## 2019-11-06 NOTE — ROUTINE PROCESS
Assume care of patient from [place], [place]. Patient received in bed awake. Patient A&Ox4, denies pain and discomfort. No distress noted. Family present. Frequently use items within reach. Bed locked in low position. Call bell within reach and patient verbalized understanding of use for assistance and needs. 6705- Bedside and Verbal shift change report given to Maryanne Andrade RN (oncoming nurse) by Cher Velez RN (offgoing nurse). Report included the following information SBAR, Kardex, Intake/Output, MAR and Recent Results.

## 2019-11-06 NOTE — PROGRESS NOTES
Transition of care: anticipate home when medically cleared  Met with patient and her spouse at bedside. Patient is alert and oriented. States she lives with her  she has a rw but does not really use it per patient. Patient reports she has medicare for insurance. Reports she has Dr. Farhan Hodgson for pcp. States she had a stress today but waiting on results MD lim She could go home today if it is negative per patient. She reports she is IADL's. She verified address on EMR . Denies needs from cm. Will continue to follow  Care Management Interventions  PCP Verified by CM:  Yes  Palliative Care Criteria Met (RRAT>21 & CHF Dx)?: No  Transition of Care Consult (CM Consult): Discharge Planning  Physical Therapy Consult: Yes  Current Support Network: Lives with Spouse  Confirm Follow Up Transport: Family  Plan discussed with Pt/Family/Caregiver: Yes  Freedom of Choice Offered: Yes  Discharge Location  Discharge Placement: Home with family assistance

## 2019-11-06 NOTE — PROGRESS NOTES
Problem: Falls - Risk of  Goal: *Absence of Falls  Description  Document Moy Stade Fall Risk and appropriate interventions in the flowsheet. Outcome: Progressing Towards Goal  Note:   Fall Risk Interventions:  Mobility Interventions: Communicate number of staff needed for ambulation/transfer, Bed/chair exit alarm, Patient to call before getting OOB, Utilize walker, cane, or other assistive device         Medication Interventions: Bed/chair exit alarm, Patient to call before getting OOB, Teach patient to arise slowly    Elimination Interventions: Bed/chair exit alarm, Call light in reach, Patient to call for help with toileting needs, Stay With Me (per policy), Toilet paper/wipes in reach, Toileting schedule/hourly rounds              Problem: Patient Education: Go to Patient Education Activity  Goal: Patient/Family Education  Outcome: Progressing Towards Goal     Problem: Pain  Goal: *Control of Pain  Outcome: Progressing Towards Goal     Problem: Patient Education: Go to Patient Education Activity  Goal: Patient/Family Education  Outcome: Progressing Towards Goal     Problem: Pressure Injury - Risk of  Goal: *Prevention of pressure injury  Description  Document Kody Scale and appropriate interventions in the flowsheet.   Outcome: Progressing Towards Goal  Note:   Pressure Injury Interventions:       Moisture Interventions: Absorbent underpads, Apply protective barrier, creams and emollients, Check for incontinence Q2 hours and as needed, Internal/External urinary devices, Minimize layers, Moisture barrier    Activity Interventions: Increase time out of bed, Pressure redistribution bed/mattress(bed type)    Mobility Interventions: Pressure redistribution bed/mattress (bed type), HOB 30 degrees or less         Friction and Shear Interventions: HOB 30 degrees or less, Foam dressings/transparent film/skin sealants, Apply protective barrier, creams and emollients                Problem: Patient Education: Go to Patient Education Activity  Goal: Patient/Family Education  Outcome: Progressing Towards Goal     Problem: Patient Education: Go to Patient Education Activity  Goal: Patient/Family Education  Outcome: Progressing Towards Goal

## 2019-11-06 NOTE — PROGRESS NOTES
NUTRITION UPDATE    - Went to provide pt with cardiac diet education today. Pt was out of room for stress test so left diet information handouts with . Let him know to contact us with any questions.        Castro Vance RD  PAGER:  [phone]

## 2019-11-06 NOTE — PROGRESS NOTES
Cardiology Progress Note        Patient: Rosario Ulloa        Sex: female          DOA: 11/4/2019  YOB: 1955      Age:  71 y.o.        LOS:  LOS: 2 days   Assessment/Plan     Active Problems:    Adenocarcinoma of lung ([facility] [address] (4/14/2008)      Benign essential hypertension (10/14/2016)      Overview: Last Assessment & Plan:         Blood pressure is controlled on current regimen of carvedilol 6.25 mg       b.i.d., diltiazem  mg daily, Aldactone 25 mg daily. Continue       current regimen and low-salt diet. Frequency of micturition (4/17/2012)      Gastroesophageal reflux disease (9/30/2004)      History of tobacco use (8/1/2003)      Hypercholesterolemia (1/31/2011)      Atrial fibrillation with RVR ([facility] [address] (11/4/2019)        Plan:    WEEKS  SOB  Afib    Abnormal stress test  Discussed with pt,  and daughter, recommended [facility] possible PCI  R/B/A discussed and will schedule tomorrow. Subjective:    cc:  WEEKS  SOB  Afib        REVIEW OF SYSTEMS:     General: No fevers or chills. Cardiovascular: No chest pain or pressure. No palpitations. No ankle swelling  Pulmonary: No SOB, orthopnea, PND  Gastrointestinal: No nausea, vomiting or diarrhea      Objective:      Visit Vitals  /55   Pulse 78   Temp 98.7 °F (37.1 °C)   Resp 16   Ht 5' 3\" (1.6 m)   Wt 114.4 kg (252 lb 4.8 oz)   SpO2 98%   Breastfeeding? No   BMI 44.69 kg/m²     Body mass index is 44.69 kg/m². Physical Exam:  General Appearance: Comfortable, not using accessory muscles of respiration. NECK: No JVD, no thyroidomeglay. LUNGS: Clear bilaterally. HEART: S1+S2 audible,    ABD: Non-tender, BS Audible    EXT: No edema, and no cysnosis. VASCULAR EXAM: Pulses are intact. PSYCHIATRIC EXAM: Mood is appropriate.     Medication:  Current Facility-Administered Medications   Medication Dose Route Frequency    acetaminophen (TYLENOL) tablet 650 mg  650 mg Oral Q4H PRN    nystatin (MYCOSTATIN) 100,000 unit/gram powder   Topical BID    nitroglycerin (NITROSTAT) tablet 0.4 mg  0.4 mg SubLINGual PRN    carvedilol (COREG) tablet 6.25 mg  6.25 mg Oral BID WITH MEALS    aspirin delayed-release tablet 81 mg  81 mg Oral DAILY    sodium chloride (NS) flush 5-40 mL  5-40 mL IntraVENous Q8H    sodium chloride (NS) flush 5-40 mL  5-40 mL IntraVENous PRN    magnesium hydroxide (MILK OF MAGNESIA) 400 mg/5 mL oral suspension 30 mL  30 mL Oral DAILY PRN    docusate sodium (COLACE) capsule 100 mg  100 mg Oral BID    perflutren lipid microspheres (DEFINITY) in NS bolus IV  1 mL IntraVENous PRN    busPIRone (BUSPAR) tablet 5 mg  5 mg Oral TID    omeprazole (PRILOSEC) capsule 20 mg  20 mg Oral DAILY    potassium chloride (KLOR-CON) tablet 10 mEq  10 mEq Oral DAILY    pravastatin (PRAVACHOL) tablet 40 mg  40 mg Oral QHS    spironolactone (ALDACTONE) tablet 25 mg  25 mg Oral DAILY    venlafaxine-SR (EFFEXOR-XR) capsule 225 mg  225 mg Oral DAILY WITH BREAKFAST    enoxaparin (LOVENOX) injection 110 mg  110 mg SubCUTAneous Q12H    neomycin-bacitracnZn-polymyxnB (NEOSPORIN) ointment 1 Packet  1 Packet Topical BID               Lab/Data Reviewed:  Procedures/imaging: see electronic medical records for all procedures/Xrays   and details which were not copied into this note but were reviewed prior to creation of Plan       All lab results for the last 24 hours reviewed.      Recent Labs     11/06/19  0240 11/05/19  0328 11/04/19  1641   WBC 5.9 8.7 8.1   HGB 12.3 12.3 14.3   HCT 39.8 38.7 44.7    252 294     Recent Labs     11/05/19  0330 11/04/19  1641    143   K 4.4 4.1    109   CO2 26 26   * 122*   BUN 12 15   CREA 1.05 1.09   CA 9.2 9.8       Signed By: Hernan Henderson MD     November 6, 2019

## 2019-11-06 NOTE — PROGRESS NOTES
0710 Received bedside report from Herber Lopez, RN. Pt in bed, asking if she can have a tylenol, need an order. 1130 Received telephone order from Dr. Jose Aranda for tylenol. 1925 Bedside and Verbal shift change report given to FIDENCIO Major RN (oncoming nurse) by [place]. Kendrick David RN (offgoing nurse). Report included the following information SBAR, Kardex, Intake/Output and MAR.

## 2019-11-06 NOTE — WOUND CARE
Received consult for wound care evaluation. Attempted to see patient for assessment, patient not in room. Will return at later date/time.

## 2019-11-06 NOTE — PROGRESS NOTES
Problem: Falls - Risk of  Goal: *Absence of Falls  Description  Document Manasa Earl Fall Risk and appropriate interventions in the flowsheet. Outcome: Progressing Towards Goal  Note:   No falls during shift. Pt calls for assistance. Fall Risk Interventions:  Mobility Interventions: Communicate number of staff needed for ambulation/transfer         Medication Interventions: Teach patient to arise slowly    Elimination Interventions: Call light in reach              Problem: Pain  Goal: *Control of Pain  Outcome: Progressing Towards Goal  Note:   Pt using tylenol for pain, may need something stronger.

## 2019-11-06 NOTE — PROGRESS NOTES
Hospitalist Progress Note    Patient: Ingris Collins MRN: [id number]  CSN: [id number]    YOB: 1955  Age: 71 y.o.   Sex: female    DOA: 11/4/2019 LOS:  LOS: 2 days          Chief Complaint:  Chest pain        Assessment/Plan     70 yo lady with hx PAF, lung ca s/p chemo and XRT (over 5 yrs ago) presented with crushing chest pain, afib with RVR     Afib with RVR -improved rate-stabilized, and resolved chest pain  For stress test today  Patient prefers to take asa only versus AC due to hx of GI bleeding     Elevated troponin\    Morbid obesity      Urinary incontinence  [place] [facility]    [place] statin     New adrenal mass as well as lung nodule i as well as renal lesion in a patient with history of lung cancer can follow-up as outpatient with hematology/oncology-discussed with patient    Await cardiology results and recs further    Anticipating d/c later today or tomorrow if stable     Disposition :  Patient Active Problem List   Diagnosis Code    Urge incontinence N39.41    Stress incontinence N39.3    Adenocarcinoma of lung ([address] C34.90    Benign essential hypertension I10    Chronic right-sided low back pain without sciatica M54.5, G89.29    Degeneration of intervertebral disc of lumbosacral region M51.37    Excessive cerumen in ear canal, left H61.22    Exposure of implanted vaginal mesh and other prosthetic materials into vagina T83.721A, T83.729A    Frequency of micturition R35.0    Gastroesophageal reflux disease K21.9    History of tobacco use Z87.891    Hypercholesterolemia E78.00    Hyperlipidemia E78.5    Hypertensive heart disease I11.9    Mixed anxiety depressive disorder F41.8    Morbid obesity ([address] E66.01    Osteoarthritis of hip M16.9    Osteopenia M85.80    Vitamin D deficiency E55.9    Atrial fibrillation with RVR ([address] I48.91       Subjective:    Denies chest pain, SOB, palpitations    Review of systems:      Respiratory: denies SOB, cough  Cardiovascular: denies chest pain, palpitations  Gastrointestinal: denies nausea      Vital signs/Intake and Output:  Visit Vitals  /62   Pulse 78   Temp 97.4 °F (36.3 °C)   Resp 14   Ht 5' 3\" (1.6 m)   Wt 114.4 kg (252 lb 4.8 oz)   SpO2 94%   Breastfeeding? No   BMI 44.69 kg/m²     Current Shift:  No intake/output data recorded.   Last three shifts:  11/04 1901 - 11/06 0700  In: 53.5 [I.V.:53.5]  Out: 300 [Urine:300]    Exam:    General: Well developed, obese WF, alert, NAD, OX3  CVS:irreg rhythm reg rate, no M/R/G, S1/S2 heard, no thrill  Lungs:Clear to auscultation bilaterally, no wheezes, rhonchi, or rales  Abdomen: Soft, Nontender, No distention, Normal Bowel sounds, No hepatomegaly  Extremities: No C/C/E, pulses palpable 2+  Neuro:grossly normal , follows commands  Psych:appropriate                Labs: Results:       Chemistry Recent Labs     11/05/19  0330 11/04/19  1641   * 122*    143   K 4.4 4.1    109   CO2 26 26   BUN 12 15   CREA 1.05 1.09   CA 9.2 9.8   AGAP 6 8   BUCR 11* 14   AP 88 110   TP 6.6 8.2   ALB 3.0* 3.8   GLOB 3.6 4.4*   AGRAT 0.8 0.9      CBC w/Diff Recent Labs     11/06/19  0240 11/05/19  0328 11/04/19  1641   WBC 5.9 8.7 8.1   RBC 4.41 4.36 5.01   HGB 12.3 12.3 14.3   HCT 39.8 38.7 44.7    252 294   GRANS 48 50 53   LYMPH 38 36 33   EOS 3 2 3      Cardiac Enzymes Recent Labs     11/05/19  1714 11/05/19  0950    151   CKND1 3.1 3.9      Coagulation Recent Labs     11/06/19  0240 11/05/19  0950 11/05/19  0328   PTP  --   --  13.1   INR  --   --  1.0   APTT 36.0 62.1* 64.5*       Lipid Panel Lab Results   Component Value Date/Time    Cholesterol, total 196 11/06/2019 02:40 AM    HDL Cholesterol 37 (L) 11/06/2019 02:40 AM    LDL, calculated 116.6 (H) 11/06/2019 02:40 AM    VLDL, calculated 42.4 11/06/2019 02:40 AM    Triglyceride 212 (H) 11/06/2019 02:40 AM    CHOL/HDL Ratio 5.3 (H) 11/06/2019 02:40 AM      BNP No results for input(s): BNPP in the last 72 hours.    Liver Enzymes Recent Labs     11/05/19  0330   TP 6.6   ALB 3.0*   AP 88   SGOT 17      Thyroid Studies Lab Results   Component Value Date/Time    TSH 6.92 (H) 11/05/2019 03:29 AM        Procedures/imaging: see electronic medical records for all procedures/Xrays and details which were not copied into this note but were reviewed prior to creation of Fabiano Doran MD

## 2019-11-07 LAB
ANION GAP SERPL CALC-SCNC: 8 MMOL/L (ref 3–18)
BASOPHILS # BLD: 0 K/UL (ref 0–0.1)
BASOPHILS NFR BLD: 1 % (ref 0–2)
BUN SERPL-MCNC: 12 MG/DL (ref 7–18)
BUN/CREAT SERPL: 13 (ref 12–20)
CALCIUM SERPL-MCNC: 9.2 MG/DL (ref 8.5–10.1)
CHLORIDE SERPL-SCNC: 105 MMOL/L (ref 100–111)
CO2 SERPL-SCNC: 27 MMOL/L (ref 21–32)
CRD SYSTOLIC BP: 139
CREAT SERPL-MCNC: 0.94 MG/DL (ref 0.6–1.3)
DIFFERENTIAL METHOD BLD: ABNORMAL
END DIASTOLIC PRESSURE: 20
EOSINOPHIL # BLD: 0.2 K/UL (ref 0–0.4)
EOSINOPHIL NFR BLD: 4 % (ref 0–5)
ERYTHROCYTE [DISTWIDTH] IN BLOOD BY AUTOMATED COUNT: 14 % (ref 11.6–14.5)
GLUCOSE SERPL-MCNC: 91 MG/DL (ref 74–99)
HCT VFR BLD AUTO: 37.5 % (ref 35–45)
HGB BLD-MCNC: 11.9 G/DL (ref 12–16)
LYMPHOCYTES # BLD: 2.1 K/UL (ref 0.9–3.6)
LYMPHOCYTES NFR BLD: 36 % (ref 21–52)
MCH RBC QN AUTO: 28 PG (ref 24–34)
MCHC RBC AUTO-ENTMCNC: 31.7 G/DL (ref 31–37)
MCV RBC AUTO: 88.2 FL (ref 74–97)
MONOCYTES # BLD: 0.7 K/UL (ref 0.05–1.2)
MONOCYTES NFR BLD: 12 % (ref 3–10)
NEUTS SEG # BLD: 2.7 K/UL (ref 1.8–8)
NEUTS SEG NFR BLD: 47 % (ref 40–73)
PLATELET # BLD AUTO: 232 K/UL (ref 135–420)
PMV BLD AUTO: 9.9 FL (ref 9.2–11.8)
POTASSIUM SERPL-SCNC: 4 MMOL/L (ref 3.5–5.5)
RBC # BLD AUTO: 4.25 M/UL (ref 4.2–5.3)
SODIUM SERPL-SCNC: 140 MMOL/L (ref 136–145)
WBC # BLD AUTO: 5.7 K/UL (ref 4.6–13.2)

## 2019-11-07 PROCEDURE — 74011250636 HC RX REV CODE- 250/636: Performed by: INTERNAL MEDICINE

## 2019-11-07 PROCEDURE — 77030004521 HC CATH ANGI DX COOK -B: Performed by: INTERNAL MEDICINE

## 2019-11-07 PROCEDURE — 99152 MOD SED SAME PHYS/QHP 5/>YRS: CPT | Performed by: INTERNAL MEDICINE

## 2019-11-07 PROCEDURE — 74011250637 HC RX REV CODE- 250/637: Performed by: INTERNAL MEDICINE

## 2019-11-07 PROCEDURE — 74011000250 HC RX REV CODE- 250: Performed by: INTERNAL MEDICINE

## 2019-11-07 PROCEDURE — C1769 GUIDE WIRE: HCPCS | Performed by: INTERNAL MEDICINE

## 2019-11-07 PROCEDURE — 93458 L HRT ARTERY/VENTRICLE ANGIO: CPT | Performed by: INTERNAL MEDICINE

## 2019-11-07 PROCEDURE — 77030029997 HC DEV COM RDL R BND TELE -B: Performed by: INTERNAL MEDICINE

## 2019-11-07 PROCEDURE — 99153 MOD SED SAME PHYS/QHP EA: CPT | Performed by: INTERNAL MEDICINE

## 2019-11-07 PROCEDURE — B2111ZZ FLUOROSCOPY OF MULTIPLE CORONARY ARTERIES USING LOW OSMOLAR CONTRAST: ICD-10-PCS | Performed by: INTERNAL MEDICINE

## 2019-11-07 PROCEDURE — C1894 INTRO/SHEATH, NON-LASER: HCPCS | Performed by: INTERNAL MEDICINE

## 2019-11-07 PROCEDURE — 74011250637 HC RX REV CODE- 250/637: Performed by: HOSPITALIST

## 2019-11-07 PROCEDURE — 74011636320 HC RX REV CODE- 636/320: Performed by: INTERNAL MEDICINE

## 2019-11-07 PROCEDURE — 77030008543 HC TBNG MON PRSS MRTM -A: Performed by: INTERNAL MEDICINE

## 2019-11-07 PROCEDURE — 80048 BASIC METABOLIC PNL TOTAL CA: CPT

## 2019-11-07 PROCEDURE — 36415 COLL VENOUS BLD VENIPUNCTURE: CPT

## 2019-11-07 PROCEDURE — 4A023N7 MEASUREMENT OF CARDIAC SAMPLING AND PRESSURE, LEFT HEART, PERCUTANEOUS APPROACH: ICD-10-PCS | Performed by: INTERNAL MEDICINE

## 2019-11-07 PROCEDURE — 77030013797 HC KT TRNSDUC PRSSR EDWD -A: Performed by: INTERNAL MEDICINE

## 2019-11-07 PROCEDURE — C1887 CATHETER, GUIDING: HCPCS | Performed by: INTERNAL MEDICINE

## 2019-11-07 PROCEDURE — 65660000000 HC RM CCU STEPDOWN

## 2019-11-07 PROCEDURE — 85025 COMPLETE CBC W/AUTO DIFF WBC: CPT

## 2019-11-07 PROCEDURE — 77030016699 HC CATH ANGI DX INFN1 CARD -A: Performed by: INTERNAL MEDICINE

## 2019-11-07 PROCEDURE — 77030004522 HC CATH ANGI DX EXPO BSC -A: Performed by: INTERNAL MEDICINE

## 2019-11-07 RX ORDER — HEPARIN SODIUM 200 [USP'U]/100ML
INJECTION, SOLUTION INTRAVENOUS
Status: COMPLETED | OUTPATIENT
Start: 2019-11-07 | End: 2019-11-07

## 2019-11-07 RX ORDER — ENOXAPARIN SODIUM 150 MG/ML
120 INJECTION SUBCUTANEOUS EVERY 12 HOURS
Status: DISCONTINUED | OUTPATIENT
Start: 2019-11-07 | End: 2019-11-08 | Stop reason: HOSPADM

## 2019-11-07 RX ORDER — VERAPAMIL HYDROCHLORIDE 2.5 MG/ML
INJECTION, SOLUTION INTRAVENOUS AS NEEDED
Status: DISCONTINUED | OUTPATIENT
Start: 2019-11-07 | End: 2019-11-07 | Stop reason: HOSPADM

## 2019-11-07 RX ORDER — HEPARIN SODIUM 1000 [USP'U]/ML
INJECTION, SOLUTION INTRAVENOUS; SUBCUTANEOUS AS NEEDED
Status: DISCONTINUED | OUTPATIENT
Start: 2019-11-07 | End: 2019-11-07 | Stop reason: HOSPADM

## 2019-11-07 RX ORDER — LIDOCAINE HYDROCHLORIDE 10 MG/ML
INJECTION INFILTRATION; PERINEURAL AS NEEDED
Status: DISCONTINUED | OUTPATIENT
Start: 2019-11-07 | End: 2019-11-07 | Stop reason: HOSPADM

## 2019-11-07 RX ORDER — MIDAZOLAM HYDROCHLORIDE 1 MG/ML
INJECTION, SOLUTION INTRAMUSCULAR; INTRAVENOUS AS NEEDED
Status: DISCONTINUED | OUTPATIENT
Start: 2019-11-07 | End: 2019-11-07 | Stop reason: HOSPADM

## 2019-11-07 RX ORDER — ENOXAPARIN SODIUM 150 MG/ML
120 INJECTION SUBCUTANEOUS EVERY 12 HOURS
Qty: 11.2 ML | Refills: 0 | Status: SHIPPED
Start: 2019-11-08 | End: 2019-11-15

## 2019-11-07 RX ADMIN — BUSPIRONE HYDROCHLORIDE 5 MG: 5 TABLET ORAL at 21:13

## 2019-11-07 RX ADMIN — CARVEDILOL 6.25 MG: 3.12 TABLET, FILM COATED ORAL at 07:14

## 2019-11-07 RX ADMIN — BUSPIRONE HYDROCHLORIDE 5 MG: 5 TABLET ORAL at 17:28

## 2019-11-07 RX ADMIN — ENOXAPARIN SODIUM 120 MG: 120 INJECTION SUBCUTANEOUS at 13:07

## 2019-11-07 RX ADMIN — SPIRONOLACTONE 25 MG: 25 TABLET ORAL at 13:06

## 2019-11-07 RX ADMIN — VENLAFAXINE HYDROCHLORIDE 225 MG: 150 CAPSULE, EXTENDED RELEASE ORAL at 13:18

## 2019-11-07 RX ADMIN — DOCUSATE SODIUM 100 MG: 100 CAPSULE, LIQUID FILLED ORAL at 13:07

## 2019-11-07 RX ADMIN — NYSTATIN: 100000 POWDER TOPICAL at 21:18

## 2019-11-07 RX ADMIN — ACETAMINOPHEN 650 MG: 325 TABLET ORAL at 11:53

## 2019-11-07 RX ADMIN — BUSPIRONE HYDROCHLORIDE 5 MG: 5 TABLET ORAL at 13:06

## 2019-11-07 RX ADMIN — PRAVASTATIN SODIUM 40 MG: 20 TABLET ORAL at 21:13

## 2019-11-07 RX ADMIN — NEOMYCIN AND POLYMYXIN B SULFATES AND BACITRACIN ZINC 1 PACKET: 400; 3.5; 5 OINTMENT TOPICAL at 21:00

## 2019-11-07 RX ADMIN — POTASSIUM CHLORIDE 10 MEQ: 10 TABLET, EXTENDED RELEASE ORAL at 13:07

## 2019-11-07 RX ADMIN — NYSTATIN: 100000 POWDER TOPICAL at 13:08

## 2019-11-07 RX ADMIN — OMEPRAZOLE 20 MG: 20 CAPSULE, DELAYED RELEASE ORAL at 13:07

## 2019-11-07 RX ADMIN — Medication 10 ML: at 06:00

## 2019-11-07 RX ADMIN — Medication 10 ML: at 21:18

## 2019-11-07 RX ADMIN — Medication 10 ML: at 13:20

## 2019-11-07 RX ADMIN — CARVEDILOL 6.25 MG: 3.12 TABLET, FILM COATED ORAL at 17:28

## 2019-11-07 RX ADMIN — ASPIRIN 81 MG: 81 TABLET, COATED ORAL at 07:14

## 2019-11-07 RX ADMIN — NEOMYCIN AND POLYMYXIN B SULFATES AND BACITRACIN ZINC 1 PACKET: 400; 3.5; 5 OINTMENT TOPICAL at 09:00

## 2019-11-07 NOTE — ROUTINE PROCESS
TRANSFER - OUT REPORT: 
 
Verbal report given to NORMAN Lester RN(name) on Phil Green  being transferred to [facility] cardiac (unit) for routine progression of care Report consisted of patients Situation, Background, Assessment and  
Recommendations(SBAR). Information from the following report(s) SBAR, Kardex, Procedure Summary, Intake/Output, MAR, Recent Results, Med Rec Status and Cardiac Rhythm Afib was reviewed with the receiving nurse. Lines:  
Peripheral IV 11/04/19 Left Forearm (Active) Site Assessment Clean, dry, & intact 11/7/2019  7:47 AM  
Phlebitis Assessment 0 11/7/2019  7:47 AM  
Infiltration Assessment 0 11/7/2019  7:47 AM  
Dressing Status Clean, dry, & intact 11/7/2019  7:47 AM  
Dressing Type Tape;Transparent 11/7/2019  7:47 AM  
Hub Color/Line Status Pink;Flushed; Infusing 11/7/2019  7:47 AM  
Action Taken Open ports on tubing capped 11/7/2019  7:47 AM  
Alcohol Cap Used Yes 11/7/2019  7:47 AM  
  
 
Opportunity for questions and clarification was provided. Patient transported with: 
 Monitor Registered Nurse Tech

## 2019-11-07 NOTE — PROGRESS NOTES
6998: Monitor tech reported patient returned to a.fib at [facility] 20. Patient stable, see flow sheets for vital signs.  MD notified Patient is on lovenox for DVT prophylaxis

## 2019-11-07 NOTE — ROUTINE PROCESS
Cardiac Cath Lab:  Pre Procedure Chart Check Patients chart was accessed and reviewed for possible and/or scheduled procedure. Creatinine Clearance: 
Serum creatinine: 0.94 mg/dL 11/07/19 0149 Estimated creatinine clearance: 72.7 mL/min Total Contrast  Load: 
3 x estimated clearance amount=  218ml 75% of Contrast Load: 0.75 x Total Contrast Load=    164ml Recent Labs 11/07/19 
0149 11/06/19 
0240 11/05/19 
1714  11/05/19 
6600 WBC 5.7 5.9  --   --  8.7  
RBC 4.25 4.41  --   --  4.36  
HCT 37.5 39.8  --   --  38.7 HGB 11.9* 12.3  --   --  12.3  255  --   --  252 INR  --   --   --   --  1.0 APTT  --  36.0  --    < > 64.5* PTP  --   --   --   --  13.1   --   --    < >  --   
K 4.0  --   --    < >  --   
BUN 12  --   --    < >  --   
CREA 0.94  --   --    < >  --   
GFRAA >60  --   --    < >  --   
GFRNA 60*  --   --    < >  --   
CA 9.2  --   --    < >  --   
CPK  --   --  148   < >  --   
CKMB  --   --  4.6*   < >  --   
CKND1  --   --  3.1   < >  --   
TROIQ  --   --  0.23*   < >  --   
 < > = values in this interval not displayed. BMI: Body mass index is 44.69 kg/m². ALLERGIES:  
Allergies Allergen Reactions  Latex Hives  Codeine Unknown (comments) and Anaphylaxis  Sulfa (Sulfonamide Antibiotics) Anaphylaxis  Ibuprofen Unknown (comments)  Nitrofurantoin Other (comments)  Penicillin G Unknown (comments)  Penicillins Hives  Sulfasalazine Unknown (comments)  Tramadol Other (comments) and Nausea and Vomiting Lines: 
  
  
Peripheral IV 11/04/19 Left Forearm (Active) Site Assessment Clean, dry, & intact 11/7/2019  7:47 AM  
Phlebitis Assessment 0 11/7/2019  7:47 AM  
Infiltration Assessment 0 11/7/2019  7:47 AM  
Dressing Status Clean, dry, & intact 11/7/2019  7:47 AM  
Dressing Type Tape;Transparent 11/7/2019  7:47 AM  
Hub Color/Line Status Pink;Flushed; Infusing 11/7/2019  7:47 AM  
 Action Taken Open ports on tubing capped 2019  7:47 AM  
Alcohol Cap Used Yes 2019  7:47 AM  
 
  
 
History: 
 
Past Medical History:  
Diagnosis Date  Adenocarcinoma of lung ([facility] [address] 2008  Arthritis  Arthropathy  Bladder pain  Cardiac arrhythmia  Chronic right-sided low back pain without sciatica 2014  Degeneration of intervertebral disc of lumbosacral region 2009 Last Assessment & Plan:    Patient will be continued on oxycodone acetaminophen  7.5-325 mg tablets as directed. Total of 120 dispensed month. Patient is on a quarterly visiting schedule for her appointments  As per contract.  Difficulty urinating  Dysphagia 2017  Dysuria 2016  Enthesopathy of hip region 2009  Esophageal reflux  Excessive cerumen in ear canal, left 10/11/2017  Exposure of implanted vaginal mesh and other prosthetic materials into vagina 5/15/2012  Frequency of micturition 2012  Gastroesophageal reflux disease 2004  History of blood transfusion  HTN (hypertension)  Hypertensive heart disease 2015  Lung cancer ([facility] [address]  Malignant neoplasm without specification of site [facility])  Nocturia  Osteopenia 2012  
 SOB (shortness of breath)  Stress incontinence 10/13/2017  Urge incontinence  Urinary frequency Past Surgical History:  
Procedure Laterality Date  HX  SECTION    
 HX UROLOGICAL  2017 Cysto, injection for chemodenervation of bladder, endoscopic injection of bulking material for urinary incontinence. Dr. Tramaine Rivera, [facility]. Patient Active Problem List  
Diagnosis Code  Urge incontinence N39.41  Stress incontinence N39.3  Adenocarcinoma of lung ([facility] [address] C34.90  Benign essential hypertension I10  Chronic right-sided low back pain without sciatica M54.5, G89.29  Degeneration of intervertebral disc of lumbosacral region M51.37  
  Excessive cerumen in ear canal, left H61.22  
 Exposure of implanted vaginal mesh and other prosthetic materials into vagina T83.721A, T83.729A  Frequency of micturition R35.0  Gastroesophageal reflux disease K21.9  History of tobacco use Z87.891  
 Hypercholesterolemia E78.00  Hyperlipidemia E78.5  Hypertensive heart disease I11.9  Mixed anxiety depressive disorder F41.8  Morbid obesity ([facility]) E66.01  
 Osteoarthritis of hip M16.9  
 Osteopenia M85.80  Vitamin D deficiency E55.9  Atrial fibrillation with RVR ([facility]) I48.91  
 WEEKS (dyspnea on exertion) R06.09  
 A-fib ([facility]) I48.91  
 Abnormal nuclear stress test R94.39

## 2019-11-07 NOTE — PROGRESS NOTES
3820 Assumed care of the patient from 36[facility] (offgoing Nurse). Pt on the way to care unit. 1200 TRANSFER - IN REPORT:    Verbal report received from 100 [facility] (name) on Felicie Kitchen  being received from Care Unit (unit) for routine progression of care      Report consisted of patients Situation, Background, Assessment and   Recommendations(SBAR). Information from the following report(s) SBAR, Kardex, Procedure Summary, MAR and Cardiac Rhythm SR was reviewed with the receiving nurse. Opportunity for questions and clarification was provided. Assessment completed upon patients arrival to unit and care assumed. 1245 Pt arrived on the unit. Care assumed. 1900 Patient had an uneventful shift and remained stable. Purposeful hourly rounding completed throughout the shift, NAD noted at this time, and patient is resting quietly in bed. No concerns or requests voiced    1915 Bedside and Verbal shift change report given to Lyle Khanna RN (oncoming nurse) by Susan Melissa RN (offgoing nurse). Report included the following information SBAR, Kardex, MAR, Recent Results and Cardiac Rhythm .

## 2019-11-07 NOTE — PROGRESS NOTES
Cardiology Progress Note        Patient: Lashell Ivy        Sex: female          DOA: 11/4/2019  YOB: 1955      Age:  71 y.o.        LOS:  LOS: 3 days   Assessment/Plan     Principal Problem:    Atrial fibrillation with RVR ([facility].) (11/4/2019)    Active Problems:    Adenocarcinoma of lung ([facility] (4/14/2008)      Benign essential hypertension (10/14/2016)      Overview: Last Assessment & Plan:         Blood pressure is controlled on current regimen of carvedilol 6.25 mg       b.i.d., diltiazem  mg daily, Aldactone 25 mg daily. Continue       current regimen and low-salt diet. Frequency of micturition (4/17/2012)      Gastroesophageal reflux disease (9/30/2004)      History of tobacco use (8/1/2003)      Hypercholesterolemia (1/31/2011)      WEEKS (dyspnea on exertion) (11/4/2019)      Overview: Added automatically from request for surgery [id number]      A-fib ([facility] (11/4/2019)      Overview: Added automatically from request for surgery [id number]      Abnormal nuclear stress test (11/4/2019)      Overview: Added automatically from request for surgery [id number]        Plan:    SOB  Afib  abnormal stress test    [facility] possible today  R/B/A discussed with patient, family. Subjective:    cc:  SOB  Afib  abnormal stress test      REVIEW OF SYSTEMS:     General: No fevers or chills. Cardiovascular: No chest pain or pressure. No palpitations. No ankle swelling  Pulmonary: No SOB, orthopnea, PND  Gastrointestinal: No nausea, vomiting or diarrhea      Objective:      Visit Vitals  /64 (BP 1 Location: Left arm, BP Patient Position: At rest)   Pulse (!) 118   Temp 98.2 °F (36.8 °C)   Resp 18   Ht 5' 3\" (1.6 m)   Wt 114.4 kg (252 lb 4.8 oz)   SpO2 96%   Breastfeeding? No   BMI 44.69 kg/m²     Body mass index is 44.69 kg/m². Physical Exam:  General Appearance: Comfortable, not using accessory muscles of respiration. NECK: No JVD, no thyroidomeglay.    LUNGS: Clear bilaterally. HEART: S1+S2 audible,    ABD: Non-tender, BS Audible    EXT: No edema, and no cysnosis. VASCULAR EXAM: Pulses are intact. PSYCHIATRIC EXAM: Mood is appropriate. Medication:  Current Facility-Administered Medications   Medication Dose Route Frequency    acetaminophen (TYLENOL) tablet 650 mg  650 mg Oral Q4H PRN    enoxaparin (LOVENOX) injection 120 mg  1 mg/kg SubCUTAneous Q12H    nystatin (MYCOSTATIN) 100,000 unit/gram powder   Topical BID    nitroglycerin (NITROSTAT) tablet 0.4 mg  0.4 mg SubLINGual PRN    carvedilol (COREG) tablet 6.25 mg  6.25 mg Oral BID WITH MEALS    aspirin delayed-release tablet 81 mg  81 mg Oral DAILY    sodium chloride (NS) flush 5-40 mL  5-40 mL IntraVENous Q8H    sodium chloride (NS) flush 5-40 mL  5-40 mL IntraVENous PRN    magnesium hydroxide (MILK OF MAGNESIA) 400 mg/5 mL oral suspension 30 mL  30 mL Oral DAILY PRN    docusate sodium (COLACE) capsule 100 mg  100 mg Oral BID    perflutren lipid microspheres (DEFINITY) in NS bolus IV  1 mL IntraVENous PRN    busPIRone (BUSPAR) tablet 5 mg  5 mg Oral TID    omeprazole (PRILOSEC) capsule 20 mg  20 mg Oral DAILY    potassium chloride (KLOR-CON) tablet 10 mEq  10 mEq Oral DAILY    pravastatin (PRAVACHOL) tablet 40 mg  40 mg Oral QHS    spironolactone (ALDACTONE) tablet 25 mg  25 mg Oral DAILY    venlafaxine-SR (EFFEXOR-XR) capsule 225 mg  225 mg Oral DAILY WITH BREAKFAST    neomycin-bacitracnZn-polymyxnB (NEOSPORIN) ointment 1 Packet  1 Packet Topical BID               Lab/Data Reviewed:  Procedures/imaging: see electronic medical records for all procedures/Xrays   and details which were not copied into this note but were reviewed prior to creation of Plan       All lab results for the last 24 hours reviewed.      Recent Labs     11/07/19 0149 11/06/19  0240 11/05/19  0328   WBC 5.7 5.9 8.7   HGB 11.9* 12.3 12.3   HCT 37.5 39.8 38.7    255 252     Recent Labs     11/07/19 0149 11/05/19  0330 11/04/19  1641    143 143   K 4.0 4.4 4.1    111 109   CO2 27 26 26   GLU 91 113* 122*   BUN 12 12 15   CREA 0.94 1.05 1.09   CA 9.2 9.2 9.8       Signed By: Mejia Siegel MD     November 7, 2019

## 2019-11-07 NOTE — PROGRESS NOTES
Problem: Falls - Risk of  Goal: *Absence of Falls  Description  Document Cong Regalado Fall Risk and appropriate interventions in the flowsheet. Outcome: Progressing Towards Goal  Note:   Fall Risk Interventions:  Mobility Interventions: Patient to call before getting OOB         Medication Interventions: Patient to call before getting OOB, Teach patient to arise slowly    Elimination Interventions: Call light in reach, Patient to call for help with toileting needs, Toileting schedule/hourly rounds              Problem: Patient Education: Go to Patient Education Activity  Goal: Patient/Family Education  Outcome: Progressing Towards Goal     Problem: Pain  Goal: *Control of Pain  Outcome: Progressing Towards Goal     Problem: Patient Education: Go to Patient Education Activity  Goal: Patient/Family Education  Outcome: Progressing Towards Goal     Problem: Pressure Injury - Risk of  Goal: *Prevention of pressure injury  Description  Document Kody Scale and appropriate interventions in the flowsheet.   Outcome: Progressing Towards Goal  Note:   Pressure Injury Interventions:       Moisture Interventions: Apply protective barrier, creams and emollients    Activity Interventions: Pressure redistribution bed/mattress(bed type)    Mobility Interventions: PT/OT evaluation         Friction and Shear Interventions: Apply protective barrier, creams and emollients, Lift sheet                Problem: Patient Education: Go to Patient Education Activity  Goal: Patient/Family Education  Outcome: Progressing Towards Goal     Problem: Patient Education: Go to Patient Education Activity  Goal: Patient/Family Education  Outcome: Progressing Towards Goal

## 2019-11-07 NOTE — PROGRESS NOTES
Cardiology Progress Note        Patient: Regina Sun        Sex: female          DOA: 11/4/2019  YOB: 1955      Age:  71 y.o.        LOS:  LOS: 3 days   Assessment/Plan     Severe two vessel CAD ( proximal LCX 90% calcified lesion dominant vessel and Mid LAD 80% stenosis)  Recommended CABG  Pt [facility] to go to [facility].   THX    Signed By: Joseluis Paul MD     November 7, 2019

## 2019-11-07 NOTE — PROGRESS NOTES
Transition of care:   Patient currently off unit  Met with patient and her  yesterday. Patient reports her d/c plan is to return home when medically cleared. She has pcp in [place]. She would like to follow up with Dr. Que Nicole for cardiology when she is d/c. She is IADL's.  will drive her home. She has medicare for insurance. She denies other needs from cm . Will continue to follow  Care Management Interventions  PCP Verified by CM:  Yes  Palliative Care Criteria Met (RRAT>21 & CHF Dx)?: No  Transition of Care Consult (CM Consult): Discharge Planning  Physical Therapy Consult: Yes  Current Support Network: Lives with Spouse  Confirm Follow Up Transport: Family  Plan discussed with Pt/Family/Caregiver: Yes  Freedom of Choice Offered: Yes  Discharge Location  Discharge Placement: Home with family assistance

## 2019-11-07 NOTE — PROGRESS NOTES
Pt is all prepped and ready for procedure. Video offered but declined. 1576 Picked by cath lab staff for the procedure    1025 Pt back to care unit S/P cardiac cath. Pt awake and alert and tolerated procedure well. Will need surgical consult. TR band to rt wrist with immobilizer in place. Site WNL. .    Family @ bedside. 1145 TR band removed and tolerated well. Sterile 2x2 with tegaderm dressing in place. Site WNL. 1230 Report called to unit and pt will be taken back to room. Nitish Monteiro Awaiting transportation    1300 Pt taken back to room with family @ bedside. Awaiting to F/U with Dr Yolanda Broderick. Nitish Monteiro

## 2019-11-07 NOTE — PROGRESS NOTES
Verbal bedside received from [facility] off going nurse. Assumed patient care, bed in low position, call light within reach, white board updated. 2000 Patient assessment completed. No acute distress. 2330 Bedside and Verbal shift change report given to Rahul Drew (oncoming nurse) by Karlene Guillen (offgoing nurse). Report included the following information SBAR, Kardex and MAR.

## 2019-11-07 NOTE — PROGRESS NOTES
Cardiology Progress Note        Patient: Jenaro Jesus        Sex: female          DOA: 11/4/2019  YOB: 1955      Age:  71 y.o.        LOS:  LOS: 3 days   Assessment/Plan     Discussed with Dr. Kassie Bowden, cardiothoracic surgeon who have accepted the pt. Discussed with Dr. Ai Rinaldi and pt & family.  THX    Signed By: Amanda Castillo MD     November 7, 2019

## 2019-11-08 VITALS
HEIGHT: 63 IN | TEMPERATURE: 97.4 F | HEART RATE: 85 BPM | WEIGHT: 246.7 LBS | RESPIRATION RATE: 18 BRPM | SYSTOLIC BLOOD PRESSURE: 141 MMHG | DIASTOLIC BLOOD PRESSURE: 62 MMHG | BODY MASS INDEX: 43.71 KG/M2 | OXYGEN SATURATION: 98 %

## 2019-11-08 PROBLEM — I25.110 CORONARY ARTERY DISEASE INVOLVING NATIVE CORONARY ARTERY WITH UNSTABLE ANGINA PECTORIS (HCC): Status: ACTIVE | Noted: 2019-11-08

## 2019-11-08 LAB
BASOPHILS # BLD: 0 K/UL (ref 0–0.1)
BASOPHILS NFR BLD: 1 % (ref 0–2)
DIFFERENTIAL METHOD BLD: ABNORMAL
EOSINOPHIL # BLD: 0.2 K/UL (ref 0–0.4)
EOSINOPHIL NFR BLD: 3 % (ref 0–5)
ERYTHROCYTE [DISTWIDTH] IN BLOOD BY AUTOMATED COUNT: 14 % (ref 11.6–14.5)
HCT VFR BLD AUTO: 39.2 % (ref 35–45)
HGB BLD-MCNC: 12.4 G/DL (ref 12–16)
LYMPHOCYTES # BLD: 2.1 K/UL (ref 0.9–3.6)
LYMPHOCYTES NFR BLD: 34 % (ref 21–52)
MCH RBC QN AUTO: 27.9 PG (ref 24–34)
MCHC RBC AUTO-ENTMCNC: 31.6 G/DL (ref 31–37)
MCV RBC AUTO: 88.3 FL (ref 74–97)
MONOCYTES # BLD: 0.7 K/UL (ref 0.05–1.2)
MONOCYTES NFR BLD: 12 % (ref 3–10)
NEUTS SEG # BLD: 3.2 K/UL (ref 1.8–8)
NEUTS SEG NFR BLD: 50 % (ref 40–73)
PLATELET # BLD AUTO: 245 K/UL (ref 135–420)
PMV BLD AUTO: 10.6 FL (ref 9.2–11.8)
RBC # BLD AUTO: 4.44 M/UL (ref 4.2–5.3)
WBC # BLD AUTO: 6.2 K/UL (ref 4.6–13.2)

## 2019-11-08 PROCEDURE — 85025 COMPLETE CBC W/AUTO DIFF WBC: CPT

## 2019-11-08 PROCEDURE — 36415 COLL VENOUS BLD VENIPUNCTURE: CPT

## 2019-11-08 PROCEDURE — 74011250637 HC RX REV CODE- 250/637: Performed by: HOSPITALIST

## 2019-11-08 PROCEDURE — 74011250636 HC RX REV CODE- 250/636: Performed by: INTERNAL MEDICINE

## 2019-11-08 PROCEDURE — 74011000250 HC RX REV CODE- 250: Performed by: INTERNAL MEDICINE

## 2019-11-08 PROCEDURE — 74011250637 HC RX REV CODE- 250/637: Performed by: INTERNAL MEDICINE

## 2019-11-08 RX ADMIN — NYSTATIN: 100000 POWDER TOPICAL at 08:46

## 2019-11-08 RX ADMIN — CARVEDILOL 6.25 MG: 3.12 TABLET, FILM COATED ORAL at 08:45

## 2019-11-08 RX ADMIN — DOCUSATE SODIUM 100 MG: 100 CAPSULE, LIQUID FILLED ORAL at 08:44

## 2019-11-08 RX ADMIN — POTASSIUM CHLORIDE 10 MEQ: 10 TABLET, EXTENDED RELEASE ORAL at 08:44

## 2019-11-08 RX ADMIN — BUSPIRONE HYDROCHLORIDE 5 MG: 5 TABLET ORAL at 08:45

## 2019-11-08 RX ADMIN — Medication 10 ML: at 06:00

## 2019-11-08 RX ADMIN — NEOMYCIN AND POLYMYXIN B SULFATES AND BACITRACIN ZINC 1 PACKET: 400; 3.5; 5 OINTMENT TOPICAL at 09:00

## 2019-11-08 RX ADMIN — ENOXAPARIN SODIUM 120 MG: 120 INJECTION SUBCUTANEOUS at 03:29

## 2019-11-08 RX ADMIN — VENLAFAXINE HYDROCHLORIDE 225 MG: 150 CAPSULE, EXTENDED RELEASE ORAL at 08:44

## 2019-11-08 RX ADMIN — OMEPRAZOLE 20 MG: 20 CAPSULE, DELAYED RELEASE ORAL at 08:44

## 2019-11-08 RX ADMIN — ASPIRIN 81 MG: 81 TABLET, COATED ORAL at 08:45

## 2019-11-08 RX ADMIN — SPIRONOLACTONE 25 MG: 25 TABLET ORAL at 08:44

## 2019-11-08 NOTE — PROGRESS NOTES
5353 Assumed care of the patient from [facility] (offgoing Nurse). Patient is alert and oriented. Pt denies any pain or discomfort at this moment. bed in low position, call bell within reach. 8183 TRANSFER - OUT REPORT:    Verbal report given to Beth Saucedo RN (name) on Marcia Dupree  being transferred to [facility]  [address] [place] (unit) for urgent transfer       Report consisted of patients Situation, Background, Assessment and   Recommendations(SBAR). Information from the following report(s) SBAR, Kardex, [facility] and Cardiac Rhythm SR BBB was reviewed with the receiving nurse. Lines:   Peripheral IV 11/04/19 Left Forearm (Active)   Site Assessment Clean, dry, & intact 11/8/2019  5:00 AM   Phlebitis Assessment 0 11/8/2019  5:00 AM   Infiltration Assessment 0 11/8/2019  5:00 AM   Dressing Status Clean, dry, & intact 11/8/2019  5:00 AM   Dressing Type Tape;Transparent 11/8/2019  5:00 AM   Hub Color/Line Status Pink;Flushed 11/8/2019  5:00 AM   Action Taken Open ports on tubing capped 11/8/2019  5:00 AM   Alcohol Cap Used Yes 11/8/2019  5:00 AM        Opportunity for questions and clarification was provided.       Patient transported with:   Monitor  Tech

## 2019-11-08 NOTE — PROGRESS NOTES
Chart reviewed. Pt to transfer to [facility].  CM will cont to follow as needed for transition of care needs,

## 2019-11-08 NOTE — PROGRESS NOTES
Problem: Falls - Risk of  Goal: *Absence of Falls  Description  Document Moy Stade Fall Risk and appropriate interventions in the flowsheet. Outcome: Progressing Towards Goal  Note:   Fall Risk Interventions:  Mobility Interventions: Patient to call before getting OOB         Medication Interventions: Patient to call before getting OOB    Elimination Interventions: Call light in reach, Patient to call for help with toileting needs, Stay With Me (per policy)              Problem: Patient Education: Go to Patient Education Activity  Goal: Patient/Family Education  Outcome: Progressing Towards Goal     Problem: Pain  Goal: *Control of Pain  Outcome: Progressing Towards Goal     Problem: Patient Education: Go to Patient Education Activity  Goal: Patient/Family Education  Outcome: Progressing Towards Goal     Problem: Pressure Injury - Risk of  Goal: *Prevention of pressure injury  Description  Document Kody Scale and appropriate interventions in the flowsheet. Outcome: Progressing Towards Goal  Note:   Pressure Injury Interventions:       Moisture Interventions: Absorbent underpads, Apply protective barrier, creams and emollients, Maintain skin hydration (lotion/cream)    Activity Interventions: Increase time out of bed, Pressure redistribution bed/mattress(bed type)    Mobility Interventions: PT/OT evaluation         Friction and Shear Interventions: Apply protective barrier, creams and emollients, Lift sheet                Problem: Patient Education: Go to Patient Education Activity  Goal: Patient/Family Education  Outcome: Progressing Towards Goal     Problem: Patient Education: Go to Patient Education Activity  Goal: Patient/Family Education  Outcome: Progressing Towards Goal     Problem:  Moderate Sedation (Adult)  Goal: *Patent airway  Outcome: Progressing Towards Goal  Goal: *Adequate oxygenation  Outcome: Progressing Towards Goal  Goal: *Absence of aspiration  Outcome: Progressing Towards Goal  Goal: *Hemodynamically stable  Outcome: Progressing Towards Goal  Goal: *Optimal pain control at patient's stated goal  Outcome: Progressing Towards Goal  Goal: *Absence of nausea/vomiting  Outcome: Progressing Towards Goal  Goal: *Anxiety reduced or absent  Outcome: Progressing Towards Goal  Goal: *Absence of injury  Outcome: Progressing Towards Goal  Goal: *Level of consciousness returns to baseline  Outcome: Progressing Towards Goal  Goal: Interventions  Outcome: Progressing Towards Goal     Problem: Patient Education: Go to Patient Education Activity  Goal: Patient/Family Education  Outcome: Progressing Towards Goal     Problem: Patient Education: Go to Patient Education Activity  Goal: Patient/Family Education  Outcome: Progressing Towards Goal     Problem: Cath Lab Procedures: Pre-Procedure  Goal: Off Pathway (Use only if patient is Off Pathway)  Outcome: Progressing Towards Goal  Goal: Activity/Safety  Outcome: Progressing Towards Goal  Goal: Diagnostic Test/Procedures  Outcome: Progressing Towards Goal  Goal: Nutrition/Diet  Outcome: Progressing Towards Goal  Goal: Discharge Planning  Outcome: Progressing Towards Goal  Goal: Medications  Outcome: Progressing Towards Goal  Goal: Respiratory  Outcome: Progressing Towards Goal  Goal: Treatments/Interventions/Procedures  Outcome: Progressing Towards Goal  Goal: Psychosocial  Outcome: Progressing Towards Goal  Goal: *Verbalize description of procedure  Outcome: Progressing Towards Goal  Goal: *Consent signed  Outcome: Progressing Towards Goal     Problem: Cath Lab Procedures: Discharge Outcomes  Goal: *Stable cardiac rhythm  Outcome: Progressing Towards Goal  Goal: *Hemodynamically stable  Outcome: Progressing Towards Goal  Goal: *Optimal pain control at patient's stated goal  Outcome: Progressing Towards Goal  Goal: *Pulses palpable, skin color within defined limits, skin temperature warm  Outcome: Progressing Towards Goal  Goal: *Lungs clear or at baseline  Outcome: Progressing Towards Goal  Goal: *Demonstrates ability to perform prescribed activity without shortness of breath or discomfort  Outcome: Progressing Towards Goal  Goal: *Verbalizes home exercise program, activity guidelines, cardiac precautions  Outcome: Progressing Towards Goal  Goal: *Verbalizes understanding and describes prescribed diet  Outcome: Progressing Towards Goal  Goal: *Verbalizes understanding and describes medication purposes and frequencies  Outcome: Progressing Towards Goal  Goal: *Identifies cardiac risk factors  Outcome: Progressing Towards Goal  Goal: *No signs and symptoms of infection or wound complications  Outcome: Progressing Towards Goal  Goal: *Anxiety reduced or absent  Outcome: Progressing Towards Goal  Goal: *Verbalizes and demonstrates incision care  Outcome: Progressing Towards Goal  Goal: *Understands and describes signs and symptoms to report to providers(Stroke Metric)  Outcome: Progressing Towards Goal  Goal: *Describes follow-up/return visits to physicians  Outcome: Progressing Towards Goal  Goal: *Describes available resources and support systems  Outcome: Progressing Towards Goal  Goal: *Influenza immunization  Outcome: Progressing Towards Goal  Goal: *Pneumococcal immunization  Outcome: Progressing Towards Goal

## 2019-11-08 NOTE — DISCHARGE SUMMARY
Discharge Summary    Patient: Jez Noel MRN: [id number]  CSN: [id number]    YOB: 1955  Age: 71 y.o. Sex: female    DOA: 11/4/2019 LOS:  LOS: 3 days   Discharge Date:      Primary Care Provider:  Lucrecia Guerrero MD    Admission Diagnoses: Atrial fibrillation with RVR [facility]) [I48.91]    Discharge Diagnoses:    Problem List as of 11/7/2019 Date Reviewed: 5/11/2018          Codes Class Noted - Resolved    * (Principal) Atrial fibrillation with RVR ([address] ICD-10-CM: I48.91  ICD-9-CM: 427.31  11/4/2019 - Present        WEEKS (dyspnea on exertion) ICD-10-CM: R06.09  ICD-9-CM: 786.09  11/4/2019 - Present    Overview Signed 11/6/2019  5:27 PM by Rhina Chun MD     Added automatically from request for surgery [id number]             A-fib [facility]) ICD-10-CM: I48.91  ICD-9-CM: 427.31  11/4/2019 - Present    Overview Signed 11/6/2019  5:27 PM by Rhina Chun MD     Added automatically from request for surgery [id number]             Abnormal nuclear stress test ICD-10-CM: R94.39  ICD-9-CM: 794.39  11/4/2019 - Present    Overview Signed 11/6/2019  5:27 PM by Rhina Chun MD     Added automatically from request for surgery [id number]             Urge incontinence ICD-10-CM: N39.41  ICD-9-CM: 788.31  10/13/2017 - Present        Stress incontinence ICD-10-CM: N39.3  ICD-9-CM: Regina Bedolla  10/13/2017 - Present        Excessive cerumen in ear canal, left ICD-10-CM: H61.22  ICD-9-CM: 380.4  10/11/2017 - Present        Benign essential hypertension ICD-10-CM: I10  ICD-9-CM: 401.1  10/14/2016 - Present    Overview Signed 10/13/2017 11:44 AM by [address]:     Blood pressure is controlled on current regimen of carvedilol 6.25 mg b.i.d., diltiazem  mg daily, Aldactone 25 mg daily. Continue current regimen and low-salt diet.              Hypertensive heart disease ICD-10-CM: I11.9  ICD-9-CM: 402.90  1/21/2015 - Present        Chronic right-sided low back pain without sciatica ICD-10-CM: M54.5, G89.29  ICD-9-CM: 724.2, 338.29  4/23/2014 - Present        Vitamin D deficiency ICD-10-CM: E55.9  ICD-9-CM: 268.9  3/29/2013 - Present        Osteopenia ICD-10-CM: M85.80  ICD-9-CM: 733.90  5/25/2012 - Present        Exposure of implanted vaginal mesh and other prosthetic materials into vagina ICD-10-CM: T83.721A, T83.729A  ICD-9-CM: 629.32  5/15/2012 - Present        Frequency of micturition ICD-10-CM: R35.0  ICD-9-CM: 788.41  4/17/2012 - Present        Morbid obesity ([facility] ICD-10-CM: E66.01  ICD-9-CM: 278.01  4/17/2012 - Present        Hypercholesterolemia ICD-10-CM: E78.00  ICD-9-CM: 272.0  1/31/2011 - Present        Osteoarthritis of hip ICD-10-CM: M16.9  ICD-9-CM: 715.95  4/19/2010 - Present        Degeneration of intervertebral disc of lumbosacral region ICD-10-CM: M51.37  ICD-9-CM: 722.52  8/24/2009 - Present    Overview Signed 10/13/2017 11:44 AM by Jamie Gotti     Last Assessment & Plan:     Patient will be continued on oxycodone acetaminophen  7.5-325 mg tablets as directed. Total of 120 dispensed month. Patient is on a quarterly visiting schedule for her appointments  As per contract. Hyperlipidemia ICD-10-CM: E78.5  ICD-9-CM: 272.4  1/6/2009 - Present    Overview Signed 10/13/2017 11:44 AM by Jamie Gotti     Last Assessment & Plan:     Patient had lipid profile done in preparation for todays visit. Results show that her dyslipidemia is well controlled current dose of pravastatin 40 mg daily continue pravastatin 40 daily. Continue low-fat low-cholesterol diet. Continue to monitor LFTs periodically. Will check at next visit.              Adenocarcinoma of lung ([facility] ICD-10-CM: C34.90  ICD-9-CM: 162.9  4/14/2008 - Present        Mixed anxiety depressive disorder ICD-10-CM: F41.8  ICD-9-CM: 300.4  7/20/2006 - Present        Gastroesophageal reflux disease ICD-10-CM: K21.9  ICD-9-CM: 530.81  9/30/2004 - Present        History of tobacco use ICD-10-CM: Z87.891  ICD-9-CM: V15.82  8/1/2003 - Present        RESOLVED: Unsteady gait ICD-10-CM: R26.81  ICD-9-CM: 781.2  2/8/2017 - 11/5/2019        RESOLVED: Dysphagia ICD-10-CM: R13.10  ICD-9-CM: 787.20  1/26/2017 - 11/5/2019        RESOLVED: History of colonic polyps ICD-10-CM: Z86.010  ICD-9-CM: V12.72  1/3/2017 - 11/5/2019        RESOLVED: Bladder pain ICD-10-CM: R39.89  ICD-9-CM: 788.99  2/11/2016 - 11/5/2019    Overview Signed 10/13/2017 11:44 AM by Benedict, [address] [place] [address]:     She has chronic bladder pain and this is improving with injections of Botox. She is followed by Urology also continue current treatment plan. RESOLVED: Dysuria ICD-10-CM: R30.0  ICD-9-CM: 788.1  2/11/2016 - 11/5/2019        RESOLVED: Other dyspnea and respiratory abnormality ICD-10-CM: R06.09, R09.89  ICD-9-CM: 786.09  7/15/2014 - 11/5/2019        RESOLVED: Headache ICD-10-CM: R51  ICD-9-CM: 784.0  9/27/2013 - 11/5/2019        RESOLVED: Other long term (current) drug therapy ICD-10-CM: L57.912  ICD-9-CM: V58.69  6/26/2013 - 11/5/2019        RESOLVED: Nocturia ICD-10-CM: R35.1  ICD-9-CM: 788.43  4/17/2012 - 11/5/2019        RESOLVED: Enthesopathy of hip region ICD-10-CM: M76.899  ICD-9-CM: 726.5  1/8/2009 - 11/5/2019        RESOLVED: Hip pain ICD-10-CM: M25.559  ICD-9-CM: 719.45  12/16/2008 - 11/5/2019        RESOLVED: Persistent hypersomnia ICD-10-CM: G47.10  ICD-9-CM: 307.44  12/16/2008 - 11/5/2019        RESOLVED: Tremor ICD-10-CM: R25.1  ICD-9-CM: 781.0  11/18/2008 - 11/5/2019              Discharge Medications:     Current Discharge Medication List      START taking these medications    Details   enoxaparin (LOVENOX) 120 mg/0.8 mL injection 120 mg by SubCUTAneous route every twelve (12) hours every twelve (12) hours for 7 days.   Qty: 11.2 mL, Refills: 0         CONTINUE these medications which have NOT CHANGED    Details   nitroglycerin (NITROSTAT) 0.4 mg SL tablet 0.4 mg.      docusate sodium (COLACE) 100 mg capsule EQ STOOL SOFTENER CAPS      cholecalciferol, vitamin D3, 2,000 unit tab Take 1 Tab by mouth. carvedilol (COREG) 6.25 mg tablet 6.25 mg.      busPIRone (BUSPAR) 5 mg tablet Take 5 mg by mouth. aspirin delayed-release 81 mg tablet Take 1 Tab by mouth. albuterol (PROAIR HFA) 90 mcg/actuation inhaler PROAIR  (90 Base) MCG/ACT AERS      omega-3 acid ethyl esters (LOVAZA) 1 gram capsule FISH OIL 1000 MG CAPS      omeprazole (PRILOSEC) 20 mg capsule Take 20 mg by mouth.      pravastatin (PRAVACHOL) 40 mg tablet Take 40 mg by mouth. spironolactone (ALDACTONE) 25 mg tablet Take 1 Tab by mouth. venlafaxine-SR (EFFEXOR XR) 75 mg capsule Take 225 mg by mouth. STOP taking these medications       dilTIAZem CD (CARTIA XT) 240 mg ER capsule Comments:   Reason for Stopping:         clotrimazole-betamethasone (LOTRISONE) topical cream Comments:   Reason for Stopping:         nystatin (MYCOSTATIN) powder Comments:   Reason for Stopping:         oxyCODONE-acetaminophen (PERCOCET) 7.5-325 mg per tablet Comments:   Reason for Stopping:         potassium chloride SR (KLOR-CON 10) 10 mEq tablet Comments:   Reason for Stopping:         promethazine (PHENERGAN) 25 mg tablet Comments:   Reason for Stopping:               Discharge Condition: Good    Procedures : coronary angiogram    Consults: Cardiology      PHYSICAL EXAM   Visit Vitals  /47 (BP 1 Location: Left arm, BP Patient Position: At rest)   Pulse 76   Temp 97.7 °F (36.5 °C)   Resp 20   Ht 5' 3\" (1.6 m)   Wt 114.4 kg (252 lb 4.8 oz)   SpO2 96%   Breastfeeding? No   BMI 44.69 kg/m²     General: Awake, cooperative, no acute distress    HEENT: NC, Atraumatic. PERRLA, EOMI. Anicteric sclerae. Lungs:  CTA Bilaterally. No Wheezing/Rhonchi/Rales. Heart:  Regular  rhythm,  No murmur, No Rubs, No Gallops  Abdomen: Soft, Non distended, Non tender. +Bowel sounds,   Extremities: No c/c/e  Psych:   Not anxious or agitated.   Neurologic:  No acute neurological deficits. Admission HPI :   tOf Craig is a 71 y.o.  female who history of atrial fibrillation, lung cancer post chemo and XRT, bladder incontinence with recent removal of mesh, degenerative arthritis presents to the emergency room with chest pain associated with shortness of breath and palpitations symptoms started today after having cystoscopy done at urology office in [place]. complains of mid sternal chest pain that is like \"a hammer hitting me in the chest.\" Pt clutching at chest   Upon arrival to the emergency room she was found to be in rapid ventricular atrial fib with a heart rate of 160 she was given 10 mg of IV Cardizem with hypotension which improved with fluid was then given IV digoxin 250 mcg with slowing of heart rate down to about 120 she was later found to have slight elevation in troponin asked to admit for further control of heart rate and elevated troponin; started on heparin drip in ER   After cardiology consultation   Patient had history of atrial fibrillation in the  past has been on aspirin and Coreg also had a stress test about a year ago which was essentially normal CT scan done in the emergency room is negative for pulmonary embolism; noted abnormalities of lung, kidney  And adrenal glands. Hospital Course :   Ms. Serena Renner was admitted to telemetry, she was seen and followed by cardiology. She did not had any acute events during hospitalization. A. fib with RVR-  She was continued on her Coreg received a dose of digoxin. Also started on heparin drip. Her rate now well controlled. NSTEMI-  Her troponin elevated. Peaked at 0.36, she underwent nuclear stress test that was abnormal.  She was then taken to coronary angiogram which revealed severe two-vessel disease with proximal left circumflex flex 90% calcified lesion dominant vessel and mid LAD 80% stenosis. Cardiology recommended CABG.   Patient wants to go to [address] [facility] [address]. His cardiologist Dr. Jannett Dakins spoke with Dr. Nuha Bliss at [facility] who has graciously accepted the patient. History of urinary incontinence, recent procedure at [facility]. New adrenal mass noted as well as lung nodule noted, she has history of lung cancer. She needs to be followed up with heme-onc as outpatient after her cardiac surgery. Activity: Activity as tolerated    Diet: Cardiac Diet    Follow-up: transfer to [facility]    Disposition: transfer to [address] spent on discharge: 45       Labs: Results:       Chemistry Recent Labs     11/07/19  0149 11/05/19  0330   GLU 91 113*    143   K 4.0 4.4    111   CO2 27 26   BUN 12 12   CREA 0.94 1.05   CA 9.2 9.2   AGAP 8 6   BUCR 13 11*   AP  --  88   TP  --  6.6   ALB  --  3.0*   GLOB  --  3.6   AGRAT  --  0.8      CBC w/Diff Recent Labs     11/07/19  0149 11/06/19  0240 11/05/19  0328   WBC 5.7 5.9 8.7   RBC 4.25 4.41 4.36   HGB 11.9* 12.3 12.3   HCT 37.5 39.8 38.7    255 252   GRANS 47 48 50   LYMPH 36 38 36   EOS 4 3 2      Cardiac Enzymes Recent Labs     11/05/19  1714 11/05/19  0950    151   CKND1 3.1 3.9      Coagulation Recent Labs     11/06/19  0240 11/05/19  0950 11/05/19  0328   PTP  --   --  13.1   INR  --   --  1.0   APTT 36.0 62.1* 64.5*       Lipid Panel Lab Results   Component Value Date/Time    Cholesterol, total 196 11/06/2019 02:40 AM    HDL Cholesterol 37 (L) 11/06/2019 02:40 AM    LDL, calculated 116.6 (H) 11/06/2019 02:40 AM    VLDL, calculated 42.4 11/06/2019 02:40 AM    Triglyceride 212 (H) 11/06/2019 02:40 AM    CHOL/HDL Ratio 5.3 (H) 11/06/2019 02:40 AM      BNP No results for input(s): BNPP in the last 72 hours.    Liver Enzymes Recent Labs     11/05/19  0330   TP 6.6   ALB 3.0*   AP 88   SGOT 17      Thyroid Studies Lab Results   Component Value Date/Time    TSH 6.92 (H) 11/05/2019 03:29 AM            Significant Diagnostic Studies: Cta Chest W Or W Wo Cont    Result Date: 11/4/2019  EXAM: CTA CHEST INDICATION: Midsternal chest pain, shortness of breath. Possible PE. COMPARISON: No prior study. TECHNIQUE: Axial CT imaging from the thoracic inlet through the diaphragm with intravenous contrast utilizing CTA study for pulmonary artery evaluation. Coronal and sagittal MIP reformations were generated at a separate workstation. One or more dose reduction techniques were used on this CT: automated exposure control, adjustment of the mAs and/or kVp according to patient's size, and iterative reconstruction techniques. The specific techniques utilized on this CT exam have been documented in the patient's electronic medical record. Digital Imaging and Communications in Medicine (DICOM) format image data are available to nonaffiliated external healthcare facilities or entities on a secure, media free, reciprocally searchable basis with patient authorization for at least a 12-month period after this study. _______________ FINDINGS: EXAM QUALITY: Overall exam quality is adequate. Pulmonary arterial enhancement is adequate. The breath hold is satisfactory. PULMONARY ARTERIES: No convincing evidence of pulmonary embolism. MEDIASTINUM: Minimal atherosclerotic calcifications are present thoracic aorta. Minimal pericardial effusion is present. PLEURA: Unremarkable. LYMPH NODES: No enlarged lymph nodes by size criteria. LUNGS/AIRWAY: 3 mm nodule is present superior segment right lower lobe axial image 46. Calcified granuloma is present anteriorly in the right middle lobe axial image 67. 3 mm lateral subpleural right middle lobe nodular density is present on axial image 74, nonspecific but likely intrapulmonary lymph node. No consolidation is present. UPPER ABDOMEN: 18 mm hypodensity is present associated with the lateral limb left adrenal gland, indeterminate on this study with Hounsfield units in the 20s. Additional 13 mm right renal upper pole hypodensity, exophytic, is present with Hounsfield units also indeterminate. OSSEOUS STRUCTURES: Degenerative changes are seen in the spine. OTHER: None _______________     IMPRESSION: 1. No evidence of pulmonary embolism. 2.  No consolidation. 3. Indeterminate 3 mm right lower lobe nodule. Please see below Fleischner Thoracic Society 2017 recommendations regarding follow-up of pulmonary nodules of this size: Single Nodule A. Low risk:    < 6 mm: No routine follow up unless suspicious nodule morphology, upper lobe location or both B. High smoking or other exposure risk:    < 6 mm: No routine follow up. If suspicious nodule morphology, upper lobe location or both, then F/U CT at 12 mo. 4. Indeterminate left adrenal lesion, statistically likely adenoma unless there is known primary carcinoma. 5. Strictly indeterminate right renal hypodensity, perhaps hemorrhagic cyst. Potentially routine outpatient follow-up renal ultrasound may better characterize. Xr Chest Port    Result Date: 11/4/2019  EXAM: XR CHEST PORT CLINICAL INDICATION/HISTORY: Shortness of breath with chest pain -Additional: None COMPARISON: None TECHNIQUE: Frontal view of the chest _______________ FINDINGS: HEART AND MEDIASTINUM: Normal cardiac size and mediastinal contours. LUNGS AND PLEURAL SPACES: Lungs are moderately underexpanded. Left hemidiaphragm is mildly elevated. No focal pneumonic opacity. No evidence of pneumothorax or pleural effusion. BONY THORAX AND SOFT TISSUES: No acute osseous abnormality _______________     IMPRESSION: Moderately underexpanded lungs without superimposed acute radiographic abnormality. No results found for this or any previous visit. Please note that this dictation was completed with Copytele, the computer voice recognition software. Quite often unanticipated grammatical, syntax, homophones, and other interpretive errors are inadvertently transcribed by the computer software. Please disregard these errors. Please excuse any errors that have escaped final proofreading.      CC: Gabi Green MD

## 2019-11-08 NOTE — DISCHARGE SUMMARY
[address] [facility] SUMMARY    Name:  Julian Woody  MR#:   [id number]  :  1955  ACCOUNT #:  [de-identified]  ADMIT DATE:  2019  DISCHARGE DATE:      CODE STATUS:  The patient is a full code status. DISCHARGE DIAGNOSES:  1. Critical coronary artery disease for evaluation with CT surgery at [facility].  2.  Atrial fibrillation with rapid ventricular rate. 3.  Hypertensive heart disease. 4.  Morbid obesity. 5.  History of lung cancer. 6.  History of urinary incontinence. 7.  New adrenal mass. HOSPITAL SUMMARY:  This is a 76-year-old female with a history of AFib, lung cancer, status post chemo and radiation therapy, as well as bladder incontinence, status post a procedure at  [facility] on the day of admission. She came into the emergency room here after coming down from [place] she developed an extreme midsternal chest pain like a hammer was hitting her. She was found to be in AFib with RVR, so she was dosed with IV digoxin and IV Cardizem. She was admitted to Telemetry where she has had evaluation with a CT scan that was negative for pulmonary embolism. She had elevation in her troponin, so she underwent a stress test.  The stress test was abnormal.  She then subsequently went to cardiac cath. This revealed severe two-vessel disease with left circumflex 90% calcified lesion, in the mid LAD 80% stenotic lesion. Cardiology recommended evaluation for coronary bypass surgery. She opted to go to [facility], so they have accepted her there, and I have spoken with the hospitalist this morning. The patient is a full code status. She is currently on the following medications:  Aspirin 81 mg daily, BuSpar 5 mg three times daily, Coreg 6.25 mg twice daily, Colace 100 mg twice daily, Lovenox 120 mg subcu every 12 hours, Nystatin powder to the groin twice daily, Prilosec 20 mg daily, Klor-Con 10 mEq daily, Pravachol 40 mg at night, Aldactone 25 mg daily, Effexor  mg daily. She is stable for release today. She is awake and alert, in no acute distress. Her vitals are as follows:  Blood pressure is 142/55, pulse 81, temperature 97.9, respiratory rate 18, SaO2 is 97% on room air. She is oriented x3, obese white female, in no acute distress. Lungs are clear bilaterally. Cardiac exam, irregular rhythm, regular rate. No murmur, rub, or gallop. Abdomen is soft, nontender. Lower extremities, distal pulses palpable. No edema noted. Most recent metabolic panel yesterday was within normal limits. Her H and H, platelet count, and white count are all within normal limits. Her echocardiogram showed an EF of 56-60% with severe grade III left ventricular diastolic dysfunction. She is stable to transfer to [facility] for evaluation from [facility] surgery. 45 minutes on discharge time today.       Grzegorz Peter MD      [id number]  D:  11/08/2019 9:52  T:  11/08/2019 10:51  JOB #:  [id number]  CC:  Nunu Noriega MD

## 2019-11-08 NOTE — PROGRESS NOTES
Problem: Falls - Risk of  Goal: *Absence of Falls  Description  Document Renetta Winslow Fall Risk and appropriate interventions in the flowsheet.   Outcome: Progressing Towards Goal  Note:   Fall Risk Interventions:  Mobility Interventions: Patient to call before getting OOB         Medication Interventions: Patient to call before getting OOB    Elimination Interventions: Call light in reach

## 2019-11-08 NOTE — PROGRESS NOTES
Cardiology Progress Note        Patient: Renan Valencia        Sex: female          DOA: 11/4/2019  YOB: 1955      Age:  71 y.o.        LOS:  LOS: 4 days   Assessment/Plan     Principal Problem:    Atrial fibrillation with RVR ([facility] (11/4/2019)    Active Problems:    Adenocarcinoma of lung ([facility] (4/14/2008)      Benign essential hypertension (10/14/2016)      Overview: Last Assessment & Plan:         Blood pressure is controlled on current regimen of carvedilol 6.25 mg       b.i.d., diltiazem  mg daily, Aldactone 25 mg daily. Continue       current regimen and low-salt diet. Frequency of micturition (4/17/2012)      Gastroesophageal reflux disease (9/30/2004)      History of tobacco use (8/1/2003)      Hypercholesterolemia (1/31/2011)      WEEKS (dyspnea on exertion) (11/4/2019)      Overview: Added automatically from request for surgery [id number]      A-fib ([facility] (11/4/2019)      Overview: Added automatically from request for surgery [id number]      Abnormal nuclear stress test (11/4/2019)      Overview: Added automatically from request for surgery [id number]        Plan:    PAFIB  SOB  Abnormal stress test  CAD  Elevated trop              Plan: CABG. Pt is being transferred for CABG to [facility]. Subjective:    cc:  PAFIB  SOB  Abnormal stress test  CAD  Elevated trop        REVIEW OF SYSTEMS:     General: No fevers or chills. Cardiovascular: No chest pain or pressure. No palpitations. No ankle swelling  Pulmonary: No SOB, orthopnea, PND  Gastrointestinal: No nausea, vomiting or diarrhea      Objective:      Visit Vitals  /55 (BP 1 Location: Left arm, BP Patient Position: At rest)   Pulse 81   Temp 97.9 °F (36.6 °C)   Resp 18   Ht 5' 3\" (1.6 m)   Wt 111.9 kg (246 lb 11.2 oz)   SpO2 97%   Breastfeeding? No   BMI 43.70 kg/m²     Body mass index is 43.7 kg/m².     Physical Exam:  General Appearance: Comfortable, not using accessory muscles of respiration. NECK: No JVD, no thyroidomeglay. LUNGS: Clear bilaterally. HEART: S1+S2 audible,    ABD: Non-tender, BS Audible    EXT: No edema, and no cysnosis. VASCULAR EXAM: Pulses are intact. PSYCHIATRIC EXAM: Mood is appropriate. Right radial artery area stable and no hematoma     Medication:  Current Facility-Administered Medications   Medication Dose Route Frequency    enoxaparin (LOVENOX) injection 120 mg  120 mg SubCUTAneous Q12H    acetaminophen (TYLENOL) tablet 650 mg  650 mg Oral Q4H PRN    nystatin (MYCOSTATIN) 100,000 unit/gram powder   Topical BID    nitroglycerin (NITROSTAT) tablet 0.4 mg  0.4 mg SubLINGual PRN    carvedilol (COREG) tablet 6.25 mg  6.25 mg Oral BID WITH MEALS    aspirin delayed-release tablet 81 mg  81 mg Oral DAILY    sodium chloride (NS) flush 5-40 mL  5-40 mL IntraVENous Q8H    sodium chloride (NS) flush 5-40 mL  5-40 mL IntraVENous PRN    magnesium hydroxide (MILK OF MAGNESIA) 400 mg/5 mL oral suspension 30 mL  30 mL Oral DAILY PRN    docusate sodium (COLACE) capsule 100 mg  100 mg Oral BID    perflutren lipid microspheres (DEFINITY) in NS bolus IV  1 mL IntraVENous PRN    busPIRone (BUSPAR) tablet 5 mg  5 mg Oral TID    omeprazole (PRILOSEC) capsule 20 mg  20 mg Oral DAILY    potassium chloride (KLOR-CON) tablet 10 mEq  10 mEq Oral DAILY    pravastatin (PRAVACHOL) tablet 40 mg  40 mg Oral QHS    spironolactone (ALDACTONE) tablet 25 mg  25 mg Oral DAILY    venlafaxine-SR (EFFEXOR-XR) capsule 225 mg  225 mg Oral DAILY WITH BREAKFAST    neomycin-bacitracnZn-polymyxnB (NEOSPORIN) ointment 1 Packet  1 Packet Topical BID               Lab/Data Reviewed:  Procedures/imaging: see electronic medical records for all procedures/Xrays   and details which were not copied into this note but were reviewed prior to creation of Plan       All lab results for the last 24 hours reviewed.      Recent Labs     11/08/19  0355 11/07/19  0149 11/06/19  0240   WBC 6.2 5.7 5.9 HGB 12.4 11.9* 12.3   HCT 39.2 37.5 39.8    232 255     Recent Labs     11/07/19  0149      K 4.0      CO2 27   GLU 91   BUN 12   CREA 0.94   CA 9.2       Signed By: Diana Layton MD     November 8, 2019

## 2020-06-01 LAB
STRESS BASELINE HR: 82 BPM
STRESS ESTIMATED WORKLOAD: 1 METS
STRESS EXERCISE DUR MIN: NORMAL
STRESS PEAK DIAS BP: 80 MMHG
STRESS PEAK SYS BP: 183 MMHG
STRESS PERCENT HR ACHIEVED: 86 %
STRESS POST PEAK HR: 134 BPM
STRESS RATE PRESSURE PRODUCT: NORMAL BPM*MMHG
STRESS ST DEPRESSION: 0 MM
STRESS ST ELEVATION: 0 MM
STRESS TARGET HR: 155 BPM

## 2024-08-27 NOTE — Clinical Note
Multiple views of the left coronary artery obtained using hand injection. Atrial fibrillation/History of a stroke or TIA

## (undated) DEVICE — DRAPE,ANGIO,BRACH,STERILE,38X44: Brand: MEDLINE

## (undated) DEVICE — TORCON NB, ADVANTAGE CATHETER: Brand: TORCON NB

## (undated) DEVICE — PACK PROCEDURE SURG CATH CUST

## (undated) DEVICE — GUIDEWIRE VASC L260CM DIA0.035IN RAD 3MM J TIP L7CM PTFE

## (undated) DEVICE — SHIELD RAD 14X16 IN W/ SCOOP ABSORBER

## (undated) DEVICE — BAND RADIAL COMPR ARTERY 24CM -- REG BX/10

## (undated) DEVICE — Device

## (undated) DEVICE — TUBING PRSS MON L24IN PVC RIG NONEXPANDING M TO FEM CONN

## (undated) DEVICE — CATHETER ANGIO NTR 0.045 INX5 FRX100 CM THRULUMEN EXPO

## (undated) DEVICE — PRESSURE MONITORING SET: Brand: TRUWAVE

## (undated) DEVICE — GLIDESHEATH SLENDER STAINLESS STEEL KIT: Brand: GLIDESHEATH SLENDER

## (undated) DEVICE — PROCEDURE KIT FLUID MGMT 10 FR CUST MAINFOLD

## (undated) DEVICE — CATH DIAG FR4 EXPO 5FRX100CM -- EXPO

## (undated) DEVICE — CATHETER GUID 5FR L100CM DIA0.058IN NYL SHFT JR5.0 W/O SIDE

## (undated) DEVICE — SENSOR PLSE OXMTR AD CBL L36IN ADH FRM FIT SPO2 DISP

## (undated) DEVICE — STOPCOCK TRNSDUC 500PSI 3 W ROT M LUER LT BLU OFF HNDL R